# Patient Record
Sex: MALE | Race: WHITE | NOT HISPANIC OR LATINO | Employment: OTHER | URBAN - METROPOLITAN AREA
[De-identification: names, ages, dates, MRNs, and addresses within clinical notes are randomized per-mention and may not be internally consistent; named-entity substitution may affect disease eponyms.]

---

## 2017-04-10 RX ORDER — SIMVASTATIN 20 MG/1
TABLET, FILM COATED ORAL
Qty: 30 TABLET | Refills: 10 | Status: SHIPPED | OUTPATIENT
Start: 2017-04-10 | End: 2018-03-08 | Stop reason: SDUPTHER

## 2017-05-30 ENCOUNTER — PATIENT OUTREACH (OUTPATIENT)
Dept: ADMINISTRATIVE | Facility: HOSPITAL | Age: 72
End: 2017-05-30

## 2017-05-30 NOTE — LETTER
May 30, 2017    Dr Mayank Curran  [address] [address]             [facility]  [address]  [facility] [address]  Phone: [phone] May 30, 2017     Patient: Bob Chan    YOB: 1945   Date of Visit: 5/30/2017     To Whom It May Concern:    Please send most recent copy of Diabetic Eye Exam , since last report of Jan 2016 that states   Positive or Negative Retinopathy.      If you have any questions or concerns, please don't hesitate to call.     Sincerely,  MD June Vicente, LPN Care Coordination   [facility]  Phone [phone] ext [phone],  Fax [phone]

## 2017-06-01 ENCOUNTER — LAB VISIT (OUTPATIENT)
Dept: LAB | Facility: HOSPITAL | Age: 72
End: 2017-06-01
Attending: INTERNAL MEDICINE
Payer: MEDICARE

## 2017-06-01 DIAGNOSIS — R53.83 FATIGUE, UNSPECIFIED TYPE: ICD-10-CM

## 2017-06-01 DIAGNOSIS — E78.00 PURE HYPERCHOLESTEROLEMIA: ICD-10-CM

## 2017-06-01 DIAGNOSIS — Z12.5 SCREENING FOR PROSTATE CANCER: ICD-10-CM

## 2017-06-01 DIAGNOSIS — E11.9 DIABETES MELLITUS WITHOUT COMPLICATION: ICD-10-CM

## 2017-06-01 LAB
ALBUMIN SERPL BCP-MCNC: 3.7 G/DL
ALP SERPL-CCNC: 65 U/L
ALT SERPL W/O P-5'-P-CCNC: 20 U/L
ANION GAP SERPL CALC-SCNC: 7 MMOL/L
AST SERPL-CCNC: 18 U/L
BASOPHILS # BLD AUTO: 0.02 K/UL
BASOPHILS NFR BLD: 0.4 %
BILIRUB SERPL-MCNC: 1.3 MG/DL
BUN SERPL-MCNC: 17 MG/DL
CALCIUM SERPL-MCNC: 9.2 MG/DL
CHLORIDE SERPL-SCNC: 106 MMOL/L
CHOLEST/HDLC SERPL: 4 {RATIO}
CO2 SERPL-SCNC: 27 MMOL/L
COMPLEXED PSA SERPL-MCNC: 0.79 NG/ML
CREAT SERPL-MCNC: 0.9 MG/DL
DIFFERENTIAL METHOD: ABNORMAL
EOSINOPHIL # BLD AUTO: 0.1 K/UL
EOSINOPHIL NFR BLD: 1.5 %
ERYTHROCYTE [DISTWIDTH] IN BLOOD BY AUTOMATED COUNT: 13.8 %
EST. GFR  (AFRICAN AMERICAN): >60 ML/MIN/1.73 M^2
EST. GFR  (NON AFRICAN AMERICAN): >60 ML/MIN/1.73 M^2
GLUCOSE SERPL-MCNC: 111 MG/DL
HCT VFR BLD AUTO: 46.7 %
HDL/CHOLESTEROL RATIO: 25.3 %
HDLC SERPL-MCNC: 194 MG/DL
HDLC SERPL-MCNC: 49 MG/DL
HGB BLD-MCNC: 15.7 G/DL
LDLC SERPL CALC-MCNC: 127.2 MG/DL
LYMPHOCYTES # BLD AUTO: 0.9 K/UL
LYMPHOCYTES NFR BLD: 19.7 %
MCH RBC QN AUTO: 29.1 PG
MCHC RBC AUTO-ENTMCNC: 33.6 %
MCV RBC AUTO: 87 FL
MONOCYTES # BLD AUTO: 0.6 K/UL
MONOCYTES NFR BLD: 11.5 %
NEUTROPHILS # BLD AUTO: 3.2 K/UL
NEUTROPHILS NFR BLD: 66.5 %
NONHDLC SERPL-MCNC: 145 MG/DL
PLATELET # BLD AUTO: 121 K/UL
PMV BLD AUTO: 11 FL
POTASSIUM SERPL-SCNC: 4.2 MMOL/L
PROT SERPL-MCNC: 6.5 G/DL
RBC # BLD AUTO: 5.4 M/UL
SODIUM SERPL-SCNC: 140 MMOL/L
TRIGL SERPL-MCNC: 89 MG/DL
TSH SERPL DL<=0.005 MIU/L-ACNC: 0.92 UIU/ML
WBC # BLD AUTO: 4.78 K/UL

## 2017-06-01 PROCEDURE — 84153 ASSAY OF PSA TOTAL: CPT

## 2017-06-01 PROCEDURE — 85025 COMPLETE CBC W/AUTO DIFF WBC: CPT

## 2017-06-01 PROCEDURE — 83036 HEMOGLOBIN GLYCOSYLATED A1C: CPT

## 2017-06-01 PROCEDURE — 80053 COMPREHEN METABOLIC PANEL: CPT

## 2017-06-01 PROCEDURE — 36415 COLL VENOUS BLD VENIPUNCTURE: CPT | Mod: PO

## 2017-06-01 PROCEDURE — 84443 ASSAY THYROID STIM HORMONE: CPT

## 2017-06-01 PROCEDURE — 80061 LIPID PANEL: CPT

## 2017-06-02 LAB
ESTIMATED AVG GLUCOSE: 114 MG/DL
HBA1C MFR BLD HPLC: 5.6 %

## 2017-06-08 ENCOUNTER — OFFICE VISIT (OUTPATIENT)
Dept: INTERNAL MEDICINE | Facility: CLINIC | Age: 72
End: 2017-06-08
Payer: MEDICARE

## 2017-06-08 VITALS
TEMPERATURE: 98 F | OXYGEN SATURATION: 96 % | DIASTOLIC BLOOD PRESSURE: 84 MMHG | SYSTOLIC BLOOD PRESSURE: 134 MMHG | HEART RATE: 80 BPM | BODY MASS INDEX: 29.22 KG/M2 | WEIGHT: 197.31 LBS | HEIGHT: 69 IN

## 2017-06-08 DIAGNOSIS — J43.2 CENTRILOBULAR EMPHYSEMA: ICD-10-CM

## 2017-06-08 DIAGNOSIS — E78.00 PURE HYPERCHOLESTEROLEMIA: ICD-10-CM

## 2017-06-08 DIAGNOSIS — Z86.010 HISTORY OF COLONIC POLYPS: ICD-10-CM

## 2017-06-08 DIAGNOSIS — E11.9 DIABETES MELLITUS WITHOUT COMPLICATION: Primary | ICD-10-CM

## 2017-06-08 PROCEDURE — 99213 OFFICE O/P EST LOW 20 MIN: CPT | Mod: PBBFAC,PO | Performed by: INTERNAL MEDICINE

## 2017-06-08 PROCEDURE — 1126F AMNT PAIN NOTED NONE PRSNT: CPT | Mod: ,,, | Performed by: INTERNAL MEDICINE

## 2017-06-08 PROCEDURE — 1159F MED LIST DOCD IN RCRD: CPT | Mod: ,,, | Performed by: INTERNAL MEDICINE

## 2017-06-08 PROCEDURE — 99214 OFFICE O/P EST MOD 30 MIN: CPT | Mod: S$PBB,,, | Performed by: INTERNAL MEDICINE

## 2017-06-08 PROCEDURE — 99999 PR PBB SHADOW E&M-EST. PATIENT-LVL III: CPT | Mod: PBBFAC,,, | Performed by: INTERNAL MEDICINE

## 2017-06-08 PROCEDURE — 3044F HG A1C LEVEL LT 7.0%: CPT | Mod: ,,, | Performed by: INTERNAL MEDICINE

## 2017-06-08 NOTE — PROGRESS NOTES
"HPI:  Patient is a 71-year-old man who comes in today for follow-up of his lipids, diabetes, emphysema and his annual physical.  He's been doing very well.  He's been losing some weight.  He denies any hypoglycemic problems.  His breathing is gotten much easier with the weight loss.  There have been no other new problems or complaints.      Current MEDS: medcard review, verified and update  Allergies: Per the electronic medical record    Past Medical History:   Diagnosis Date    Diabetes mellitus without complication 12/14/2012    Emphysema of lung     History of colonic polyps     Pure hypercholesterolemia        History reviewed. No pertinent surgical history.    SHx: per the electronic medical record    FHx: recorded in the electronic medical record    ROS:    denies any chest pains or shortness of breath. Denies any nausea, vomiting or diarrhea. Denies any fever, chills or sweats. Denies any change in weight, voice, stool, skin or hair. Denies any dysuria, dyspepsia or dysphagia. Denies any change in vision, hearing or headaches. Denies any swollen lymph nodes or loss of memory.    PE:  /84   Pulse 80   Temp 97.7 °F (36.5 °C) (Tympanic)   Ht 5' 9" (1.753 m)   Wt 89.5 kg (197 lb 5 oz)   SpO2 96%   BMI 29.14 kg/m²   Gen: Well-developed, well-nourished, male, in no acute distress, oriented x3  HEENT: neck is supple, no adenopathy, carotids 2+ equal without bruits, thyroid exam normal size without nodules.  CHEST: clear to auscultation and percussion  CVS: regular rate and rhythm without significant murmur, gallop, or rubs  ABD: soft, benign, no rebound no guarding, no distention.  Bowel sounds are normal.     nontender.  No palpable masses.  No organomegaly and no audible bruits.  RECTAL: no masses.  Prostate 30  Grams without nodules.  EXT: no clubbing, cyanosis, or edema  LYMPH: no cervical, inguinal, or axillary adenopathy  FEET: no loss of sensation.  No ulcers or pressure sores.  Monofilament " testing normal  NEURO: gait normal.  Cranial nerves II- XII intact. No nystagmus.  Speech normal.   Gross motor and sensory unremarkable.    Lab Results   Component Value Date    WBC 4.78 06/01/2017    HGB 15.7 06/01/2017    HCT 46.7 06/01/2017     (L) 06/01/2017    CHOL 194 06/01/2017    TRIG 89 06/01/2017    HDL 49 06/01/2017    ALT 20 06/01/2017    AST 18 06/01/2017     06/01/2017    K 4.2 06/01/2017     06/01/2017    CREATININE 0.9 06/01/2017    BUN 17 06/01/2017    CO2 27 06/01/2017    TSH 0.917 06/01/2017    PSA 0.79 06/01/2017    HGBA1C 5.6 06/01/2017       Impression:  Multiple medical problems below, stable  Patient Active Problem List   Diagnosis    Pure hypercholesterolemia    Centrilobular emphysema    Diabetes mellitus without complication    History of colonic polyps       Plan:   Orders Placed This Encounter    Hemoglobin A1c    Comprehensive metabolic panel    Lipid panel    Protein / creatinine ratio, urine   .  His medications remain the same.  He'll be seen again in 6 months with above lab work.

## 2017-07-04 RX ORDER — ALPRAZOLAM 0.25 MG/1
TABLET ORAL
Qty: 60 TABLET | Refills: 5 | Status: SHIPPED | OUTPATIENT
Start: 2017-07-04 | End: 2017-09-27

## 2017-09-27 ENCOUNTER — OFFICE VISIT (OUTPATIENT)
Dept: INTERNAL MEDICINE | Facility: CLINIC | Age: 72
End: 2017-09-27
Payer: MEDICARE

## 2017-09-27 VITALS
OXYGEN SATURATION: 94 % | HEART RATE: 90 BPM | HEIGHT: 69 IN | BODY MASS INDEX: 29.78 KG/M2 | TEMPERATURE: 100 F | DIASTOLIC BLOOD PRESSURE: 70 MMHG | SYSTOLIC BLOOD PRESSURE: 138 MMHG | WEIGHT: 201.06 LBS

## 2017-09-27 DIAGNOSIS — G89.29 CHRONIC RIGHT-SIDED LOW BACK PAIN WITHOUT SCIATICA: Primary | ICD-10-CM

## 2017-09-27 DIAGNOSIS — M54.50 CHRONIC RIGHT-SIDED LOW BACK PAIN WITHOUT SCIATICA: Primary | ICD-10-CM

## 2017-09-27 PROCEDURE — 99213 OFFICE O/P EST LOW 20 MIN: CPT | Mod: S$PBB,,, | Performed by: INTERNAL MEDICINE

## 2017-09-27 PROCEDURE — 99999 PR PBB SHADOW E&M-EST. PATIENT-LVL III: CPT | Mod: PBBFAC,,, | Performed by: INTERNAL MEDICINE

## 2017-09-27 PROCEDURE — 1159F MED LIST DOCD IN RCRD: CPT | Mod: ,,, | Performed by: INTERNAL MEDICINE

## 2017-09-27 PROCEDURE — 99213 OFFICE O/P EST LOW 20 MIN: CPT | Mod: PBBFAC,PO | Performed by: INTERNAL MEDICINE

## 2017-09-27 PROCEDURE — 1125F AMNT PAIN NOTED PAIN PRSNT: CPT | Mod: ,,, | Performed by: INTERNAL MEDICINE

## 2017-09-27 RX ORDER — FLUTICASONE PROPIONATE AND SALMETEROL 250; 50 UG/1; UG/1
POWDER RESPIRATORY (INHALATION)
COMMUNITY
Start: 2017-08-22 | End: 2018-05-25

## 2017-09-27 RX ORDER — TIZANIDINE 4 MG/1
4 TABLET ORAL EVERY 8 HOURS PRN
Qty: 60 TABLET | Refills: 1 | Status: SHIPPED | OUTPATIENT
Start: 2017-09-27 | End: 2017-10-07

## 2017-09-27 RX ORDER — MELOXICAM 15 MG/1
15 TABLET ORAL DAILY
Qty: 30 TABLET | Refills: 2 | Status: SHIPPED | OUTPATIENT
Start: 2017-09-27 | End: 2018-06-05 | Stop reason: SDUPTHER

## 2017-09-27 NOTE — PROGRESS NOTES
"HPI:  Patient is a 72-year-old man who comes in today with complaints of lower lumbar back pain primarily in the right side.  It's been bothering him progressively for the last several weeks.  He denies any trauma or injury to the back.  He states is a deep boring pain.  He denies any radiation into his lower extremities.    Current meds have been verified and updated per the EMR  Exam:/70   Pulse 90   Temp 99.5 °F (37.5 °C) (Tympanic)   Ht 5' 9" (1.753 m)   Wt 91.2 kg (201 lb 1 oz)   SpO2 (!) 94%   BMI 29.69 kg/m²   He has point tenderness over the L3-L4 paraspinous muscles on the right side.  There is no vertebral tenderness.    Lab Results   Component Value Date    WBC 4.78 06/01/2017    HGB 15.7 06/01/2017    HCT 46.7 06/01/2017     (L) 06/01/2017    CHOL 194 06/01/2017    TRIG 89 06/01/2017    HDL 49 06/01/2017    ALT 20 06/01/2017    AST 18 06/01/2017     06/01/2017    K 4.2 06/01/2017     06/01/2017    CREATININE 0.9 06/01/2017    BUN 17 06/01/2017    CO2 27 06/01/2017    TSH 0.917 06/01/2017    PSA 0.79 06/01/2017    HGBA1C 5.6 06/01/2017       Impression:  Muscular  lumbar back pain  Patient Active Problem List   Diagnosis    Pure hypercholesterolemia    Centrilobular emphysema    Diabetes mellitus without complication    History of colonic polyps       Plan:  Orders Placed This Encounter    meloxicam (MOBIC) 15 MG tablet    tizanidine (ZANAFLEX) 4 MG tablet     He was started on mobic and Zanaflex.  He will try to get records from his pulmonologist, Dr. Mak, in regards to some questionable abnormality seen on a CT scan that he had done a year ago    "

## 2017-12-01 ENCOUNTER — LAB VISIT (OUTPATIENT)
Dept: LAB | Facility: HOSPITAL | Age: 72
End: 2017-12-01
Attending: INTERNAL MEDICINE
Payer: MEDICARE

## 2017-12-01 DIAGNOSIS — E11.9 DIABETES MELLITUS WITHOUT COMPLICATION: ICD-10-CM

## 2017-12-01 LAB
ALBUMIN SERPL BCP-MCNC: 3.9 G/DL
ALP SERPL-CCNC: 81 U/L
ALT SERPL W/O P-5'-P-CCNC: 18 U/L
ANION GAP SERPL CALC-SCNC: 7 MMOL/L
AST SERPL-CCNC: 16 U/L
BILIRUB SERPL-MCNC: 1.1 MG/DL
BUN SERPL-MCNC: 16 MG/DL
CALCIUM SERPL-MCNC: 9.7 MG/DL
CHLORIDE SERPL-SCNC: 105 MMOL/L
CHOLEST SERPL-MCNC: 189 MG/DL
CHOLEST/HDLC SERPL: 3.9 {RATIO}
CO2 SERPL-SCNC: 29 MMOL/L
CREAT SERPL-MCNC: 0.9 MG/DL
EST. GFR  (AFRICAN AMERICAN): >60 ML/MIN/1.73 M^2
EST. GFR  (NON AFRICAN AMERICAN): >60 ML/MIN/1.73 M^2
ESTIMATED AVG GLUCOSE: 111 MG/DL
GLUCOSE SERPL-MCNC: 99 MG/DL
HBA1C MFR BLD HPLC: 5.5 %
HDLC SERPL-MCNC: 49 MG/DL
HDLC SERPL: 25.9 %
LDLC SERPL CALC-MCNC: 124.8 MG/DL
NONHDLC SERPL-MCNC: 140 MG/DL
POTASSIUM SERPL-SCNC: 5.1 MMOL/L
PROT SERPL-MCNC: 6.9 G/DL
SODIUM SERPL-SCNC: 141 MMOL/L
TRIGL SERPL-MCNC: 76 MG/DL

## 2017-12-01 PROCEDURE — 80061 LIPID PANEL: CPT

## 2017-12-01 PROCEDURE — 80053 COMPREHEN METABOLIC PANEL: CPT

## 2017-12-01 PROCEDURE — 36415 COLL VENOUS BLD VENIPUNCTURE: CPT | Mod: PO

## 2017-12-01 PROCEDURE — 83036 HEMOGLOBIN GLYCOSYLATED A1C: CPT

## 2017-12-11 ENCOUNTER — OFFICE VISIT (OUTPATIENT)
Dept: INTERNAL MEDICINE | Facility: CLINIC | Age: 72
End: 2017-12-11
Payer: MEDICARE

## 2017-12-11 VITALS
OXYGEN SATURATION: 95 % | WEIGHT: 200.63 LBS | HEIGHT: 69 IN | HEART RATE: 85 BPM | DIASTOLIC BLOOD PRESSURE: 88 MMHG | SYSTOLIC BLOOD PRESSURE: 136 MMHG | BODY MASS INDEX: 29.71 KG/M2 | TEMPERATURE: 97 F

## 2017-12-11 DIAGNOSIS — J43.2 CENTRILOBULAR EMPHYSEMA: ICD-10-CM

## 2017-12-11 DIAGNOSIS — E78.00 PURE HYPERCHOLESTEROLEMIA: Primary | ICD-10-CM

## 2017-12-11 DIAGNOSIS — Z86.010 HISTORY OF COLONIC POLYPS: ICD-10-CM

## 2017-12-11 DIAGNOSIS — E11.9 DIABETES MELLITUS WITHOUT COMPLICATION: ICD-10-CM

## 2017-12-11 DIAGNOSIS — Z12.5 PROSTATE CANCER SCREENING: ICD-10-CM

## 2017-12-11 PROCEDURE — G0008 ADMIN INFLUENZA VIRUS VAC: HCPCS | Mod: PBBFAC,PO

## 2017-12-11 PROCEDURE — 99213 OFFICE O/P EST LOW 20 MIN: CPT | Mod: PBBFAC,PO | Performed by: INTERNAL MEDICINE

## 2017-12-11 PROCEDURE — 99213 OFFICE O/P EST LOW 20 MIN: CPT | Mod: 25,S$PBB,, | Performed by: INTERNAL MEDICINE

## 2017-12-11 PROCEDURE — 99999 PR PBB SHADOW E&M-EST. PATIENT-LVL III: CPT | Mod: PBBFAC,,, | Performed by: INTERNAL MEDICINE

## 2017-12-11 NOTE — PROGRESS NOTES
"HPI:  Patient is a 72-year-old man who comes today for follow-up his diabetes, hypertension, lipids.  He continues to do extremely well.  He is extremely active.  He denies any hypoglycemic problems.  He's had no other complaints    Current meds have been verified and updated per the EMR  Exam:/88   Pulse 85   Temp 97.1 °F (36.2 °C) (Tympanic)   Ht 5' 9" (1.753 m)   Wt 91 kg (200 lb 9.9 oz)   SpO2 95%   BMI 29.63 kg/m²   Exam deferred    Lab Results   Component Value Date    WBC 4.78 06/01/2017    HGB 15.7 06/01/2017    HCT 46.7 06/01/2017     (L) 06/01/2017    CHOL 189 12/01/2017    TRIG 76 12/01/2017    HDL 49 12/01/2017    ALT 18 12/01/2017    AST 16 12/01/2017     12/01/2017    K 5.1 12/01/2017     12/01/2017    CREATININE 0.9 12/01/2017    BUN 16 12/01/2017    CO2 29 12/01/2017    TSH 0.917 06/01/2017    PSA 0.79 06/01/2017    HGBA1C 5.5 12/01/2017       Impression:  Multiple medical problems below, stable  Patient Active Problem List   Diagnosis    Pure hypercholesterolemia    Centrilobular emphysema    Diabetes mellitus without complication    History of colonic polyps       Plan:  Orders Placed This Encounter    Influenza - High Dose (65+) (PF) (IM)    Hemoglobin A1c    Comprehensive metabolic panel    Lipid panel    Protein / creatinine ratio, urine    TSH    CBC auto differential    PSA, Screening     He was given flu vaccine today.  He'll be seen in 6 months with above lab work.  Her medications remain the same    "

## 2018-01-22 RX ORDER — ALPRAZOLAM 0.25 MG/1
TABLET ORAL
Qty: 60 TABLET | Refills: 4 | Status: SHIPPED | OUTPATIENT
Start: 2018-01-22 | End: 2018-09-18 | Stop reason: SDUPTHER

## 2018-01-24 RX ORDER — ALPRAZOLAM 0.25 MG/1
TABLET ORAL
Qty: 60 TABLET | Refills: 5 | Status: SHIPPED | OUTPATIENT
Start: 2018-01-24 | End: 2018-05-25

## 2018-03-08 RX ORDER — SIMVASTATIN 20 MG/1
TABLET, FILM COATED ORAL
Qty: 30 TABLET | Refills: 11 | Status: SHIPPED | OUTPATIENT
Start: 2018-03-08 | End: 2019-03-01 | Stop reason: SDUPTHER

## 2018-05-25 ENCOUNTER — OFFICE VISIT (OUTPATIENT)
Dept: INTERNAL MEDICINE | Facility: CLINIC | Age: 73
End: 2018-05-25
Payer: MEDICARE

## 2018-05-25 VITALS
WEIGHT: 213.88 LBS | SYSTOLIC BLOOD PRESSURE: 136 MMHG | BODY MASS INDEX: 30.62 KG/M2 | RESPIRATION RATE: 16 BRPM | HEIGHT: 70 IN | DIASTOLIC BLOOD PRESSURE: 84 MMHG | OXYGEN SATURATION: 97 % | TEMPERATURE: 98 F | HEART RATE: 78 BPM

## 2018-05-25 DIAGNOSIS — M54.50 LUMBAR BACK PAIN: Primary | ICD-10-CM

## 2018-05-25 PROCEDURE — 99999 PR PBB SHADOW E&M-EST. PATIENT-LVL III: CPT | Mod: PBBFAC,,, | Performed by: INTERNAL MEDICINE

## 2018-05-25 PROCEDURE — 99213 OFFICE O/P EST LOW 20 MIN: CPT | Mod: S$PBB,,, | Performed by: INTERNAL MEDICINE

## 2018-05-25 PROCEDURE — 99213 OFFICE O/P EST LOW 20 MIN: CPT | Mod: PBBFAC,PO | Performed by: INTERNAL MEDICINE

## 2018-05-25 NOTE — PROGRESS NOTES
"HPI:  Patient is a 72-year-old man who comes today with complaints right-sided lower lumbar back pain that began yesterday.  He has a history of intermittent back spasms.  He wants to know if it okay to resume his Mobic and Zanaflex.  Patient denies any pain into his buttocks.    Current meds have been verified and updated per the EMR  Exam:/84   Pulse 78   Temp 98.4 °F (36.9 °C)   Resp 16   Ht 5' 10" (1.778 m)   Wt 97 kg (213 lb 13.5 oz)   SpO2 97%   BMI 30.68 kg/m²   He has tenderness of the L to 3 paraspinous muscles on the right side.  There is no vertebral tenderness.    Lab Results   Component Value Date    WBC 4.78 06/01/2017    HGB 15.7 06/01/2017    HCT 46.7 06/01/2017     (L) 06/01/2017    CHOL 189 12/01/2017    TRIG 76 12/01/2017    HDL 49 12/01/2017    ALT 18 12/01/2017    AST 16 12/01/2017     12/01/2017    K 5.1 12/01/2017     12/01/2017    CREATININE 0.9 12/01/2017    BUN 16 12/01/2017    CO2 29 12/01/2017    TSH 0.917 06/01/2017    PSA 0.79 06/01/2017    HGBA1C 5.5 12/01/2017       Impression:  Lumbar muscular back pain  Patient Active Problem List   Diagnosis    Pure hypercholesterolemia    Centrilobular emphysema    Diabetes mellitus without complication    History of colonic polyps       Plan:     He may resume taking his anti-inflammatories and muscle relaxers.  Also encouraged him to do stretching exercises.    "

## 2018-05-28 ENCOUNTER — PATIENT OUTREACH (OUTPATIENT)
Dept: ADMINISTRATIVE | Facility: HOSPITAL | Age: 73
End: 2018-05-28

## 2018-06-04 ENCOUNTER — LAB VISIT (OUTPATIENT)
Dept: LAB | Facility: HOSPITAL | Age: 73
End: 2018-06-04
Attending: INTERNAL MEDICINE
Payer: MEDICARE

## 2018-06-04 DIAGNOSIS — Z12.5 PROSTATE CANCER SCREENING: ICD-10-CM

## 2018-06-04 DIAGNOSIS — E11.9 DIABETES MELLITUS WITHOUT COMPLICATION: ICD-10-CM

## 2018-06-04 LAB
ALBUMIN SERPL BCP-MCNC: 3.9 G/DL
ALP SERPL-CCNC: 81 U/L
ALT SERPL W/O P-5'-P-CCNC: 22 U/L
ANION GAP SERPL CALC-SCNC: 10 MMOL/L
AST SERPL-CCNC: 18 U/L
BASOPHILS # BLD AUTO: 0.04 K/UL
BASOPHILS NFR BLD: 0.7 %
BILIRUB SERPL-MCNC: 0.8 MG/DL
BUN SERPL-MCNC: 15 MG/DL
CALCIUM SERPL-MCNC: 9.5 MG/DL
CHLORIDE SERPL-SCNC: 105 MMOL/L
CHOLEST SERPL-MCNC: 179 MG/DL
CHOLEST/HDLC SERPL: 3.4 {RATIO}
CO2 SERPL-SCNC: 28 MMOL/L
COMPLEXED PSA SERPL-MCNC: 0.77 NG/ML
CREAT SERPL-MCNC: 1 MG/DL
DIFFERENTIAL METHOD: ABNORMAL
EOSINOPHIL # BLD AUTO: 0.1 K/UL
EOSINOPHIL NFR BLD: 1.8 %
ERYTHROCYTE [DISTWIDTH] IN BLOOD BY AUTOMATED COUNT: 13.5 %
EST. GFR  (AFRICAN AMERICAN): >60 ML/MIN/1.73 M^2
EST. GFR  (NON AFRICAN AMERICAN): >60 ML/MIN/1.73 M^2
GLUCOSE SERPL-MCNC: 103 MG/DL
HCT VFR BLD AUTO: 48.1 %
HDLC SERPL-MCNC: 52 MG/DL
HDLC SERPL: 29.1 %
HGB BLD-MCNC: 15.5 G/DL
IMM GRANULOCYTES # BLD AUTO: 0.02 K/UL
IMM GRANULOCYTES NFR BLD AUTO: 0.3 %
LDLC SERPL CALC-MCNC: 106.6 MG/DL
LYMPHOCYTES # BLD AUTO: 1.1 K/UL
LYMPHOCYTES NFR BLD: 18.4 %
MCH RBC QN AUTO: 28.5 PG
MCHC RBC AUTO-ENTMCNC: 32.2 G/DL
MCV RBC AUTO: 88 FL
MONOCYTES # BLD AUTO: 0.5 K/UL
MONOCYTES NFR BLD: 8.6 %
NEUTROPHILS # BLD AUTO: 4.3 K/UL
NEUTROPHILS NFR BLD: 70.2 %
NONHDLC SERPL-MCNC: 127 MG/DL
NRBC BLD-RTO: 0 /100 WBC
PLATELET # BLD AUTO: 146 K/UL
PMV BLD AUTO: 11.2 FL
POTASSIUM SERPL-SCNC: 4.4 MMOL/L
PROT SERPL-MCNC: 6.8 G/DL
RBC # BLD AUTO: 5.44 M/UL
SODIUM SERPL-SCNC: 143 MMOL/L
TRIGL SERPL-MCNC: 102 MG/DL
TSH SERPL DL<=0.005 MIU/L-ACNC: 1.37 UIU/ML
WBC # BLD AUTO: 6.14 K/UL

## 2018-06-04 PROCEDURE — 36415 COLL VENOUS BLD VENIPUNCTURE: CPT | Mod: PO

## 2018-06-04 PROCEDURE — 83036 HEMOGLOBIN GLYCOSYLATED A1C: CPT

## 2018-06-04 PROCEDURE — 80053 COMPREHEN METABOLIC PANEL: CPT

## 2018-06-04 PROCEDURE — 84153 ASSAY OF PSA TOTAL: CPT

## 2018-06-04 PROCEDURE — 80061 LIPID PANEL: CPT

## 2018-06-04 PROCEDURE — 85025 COMPLETE CBC W/AUTO DIFF WBC: CPT

## 2018-06-04 PROCEDURE — 84443 ASSAY THYROID STIM HORMONE: CPT

## 2018-06-05 ENCOUNTER — OFFICE VISIT (OUTPATIENT)
Dept: INTERNAL MEDICINE | Facility: CLINIC | Age: 73
End: 2018-06-05
Payer: MEDICARE

## 2018-06-05 VITALS
SYSTOLIC BLOOD PRESSURE: 126 MMHG | OXYGEN SATURATION: 94 % | WEIGHT: 214.31 LBS | RESPIRATION RATE: 16 BRPM | TEMPERATURE: 98 F | BODY MASS INDEX: 30.68 KG/M2 | DIASTOLIC BLOOD PRESSURE: 80 MMHG | HEIGHT: 70 IN | HEART RATE: 86 BPM

## 2018-06-05 DIAGNOSIS — M54.50 LUMBAR BACK PAIN: Primary | ICD-10-CM

## 2018-06-05 LAB
ESTIMATED AVG GLUCOSE: 117 MG/DL
HBA1C MFR BLD HPLC: 5.7 %

## 2018-06-05 PROCEDURE — 99213 OFFICE O/P EST LOW 20 MIN: CPT | Mod: PBBFAC,PO | Performed by: INTERNAL MEDICINE

## 2018-06-05 PROCEDURE — 99999 PR PBB SHADOW E&M-EST. PATIENT-LVL III: CPT | Mod: PBBFAC,,, | Performed by: INTERNAL MEDICINE

## 2018-06-05 PROCEDURE — 99213 OFFICE O/P EST LOW 20 MIN: CPT | Mod: S$PBB,,, | Performed by: INTERNAL MEDICINE

## 2018-06-05 RX ORDER — CYCLOBENZAPRINE HCL 10 MG
10 TABLET ORAL 3 TIMES DAILY PRN
Qty: 60 TABLET | Refills: 1 | Status: SHIPPED | OUTPATIENT
Start: 2018-06-05 | End: 2018-06-15

## 2018-06-05 RX ORDER — MELOXICAM 15 MG/1
15 TABLET ORAL DAILY
Qty: 30 TABLET | Refills: 2 | Status: SHIPPED | OUTPATIENT
Start: 2018-06-05

## 2018-06-05 NOTE — PROGRESS NOTES
"HPI:  Patient is a 72-year-old man who comes today for complaints of right-sided lower lumbar back pain.  This been improvement in problem for many months but much more problematic in the last 24 hr.  He denies any pain going down to his buttocks are lower extremities.  He denies any trauma or injury.    Current meds have been verified and updated per the EMR  Exam:/80   Pulse 86   Temp 97.6 °F (36.4 °C)   Resp 16   Ht 5' 10" (1.778 m)   Wt 97.2 kg (214 lb 4.6 oz)   SpO2 (!) 94%   BMI 30.75 kg/m²   He has point tenderness over the L4-3 for paraspinous muscles on the right side.  Straight leg raises are normal    Lab Results   Component Value Date    WBC 6.14 06/04/2018    HGB 15.5 06/04/2018    HCT 48.1 06/04/2018     (L) 06/04/2018    CHOL 179 06/04/2018    TRIG 102 06/04/2018    HDL 52 06/04/2018    ALT 22 06/04/2018    AST 18 06/04/2018     06/04/2018    K 4.4 06/04/2018     06/04/2018    CREATININE 1.0 06/04/2018    BUN 15 06/04/2018    CO2 28 06/04/2018    TSH 1.369 06/04/2018    PSA 0.77 06/04/2018    HGBA1C 5.7 (H) 06/04/2018       Impression:  Mechanical muscular lower back pain  Patient Active Problem List   Diagnosis    Pure hypercholesterolemia    Centrilobular emphysema    Diabetes mellitus without complication    History of colonic polyps       Plan:  Orders Placed This Encounter    Ambulatory consult to Physical Therapy    meloxicam (MOBIC) 15 MG tablet    cyclobenzaprine (FLEXERIL) 10 MG tablet     Patient will be switched from Zanaflex to Flexeril.  He will continue with Mobic.  He was referred to see physical therapy.    "

## 2018-06-12 ENCOUNTER — OFFICE VISIT (OUTPATIENT)
Dept: INTERNAL MEDICINE | Facility: CLINIC | Age: 73
End: 2018-06-12
Payer: MEDICARE

## 2018-06-12 VITALS
HEART RATE: 82 BPM | HEIGHT: 70 IN | OXYGEN SATURATION: 96 % | BODY MASS INDEX: 31.12 KG/M2 | DIASTOLIC BLOOD PRESSURE: 82 MMHG | SYSTOLIC BLOOD PRESSURE: 128 MMHG | WEIGHT: 217.38 LBS | RESPIRATION RATE: 16 BRPM | TEMPERATURE: 98 F

## 2018-06-12 DIAGNOSIS — Z86.010 HISTORY OF COLONIC POLYPS: ICD-10-CM

## 2018-06-12 DIAGNOSIS — J43.2 CENTRILOBULAR EMPHYSEMA: ICD-10-CM

## 2018-06-12 DIAGNOSIS — E78.00 PURE HYPERCHOLESTEROLEMIA: Primary | ICD-10-CM

## 2018-06-12 DIAGNOSIS — E11.9 DIABETES MELLITUS WITHOUT COMPLICATION: ICD-10-CM

## 2018-06-12 PROCEDURE — 99999 PR PBB SHADOW E&M-EST. PATIENT-LVL III: CPT | Mod: PBBFAC,,, | Performed by: INTERNAL MEDICINE

## 2018-06-12 PROCEDURE — 99213 OFFICE O/P EST LOW 20 MIN: CPT | Mod: PBBFAC,PO | Performed by: INTERNAL MEDICINE

## 2018-06-12 PROCEDURE — 99214 OFFICE O/P EST MOD 30 MIN: CPT | Mod: S$PBB,,, | Performed by: INTERNAL MEDICINE

## 2018-06-12 RX ORDER — FLUTICASONE PROPIONATE AND SALMETEROL 250; 50 UG/1; UG/1
POWDER RESPIRATORY (INHALATION)
COMMUNITY
Start: 2018-06-07

## 2018-06-12 RX ORDER — MELOXICAM 15 MG/1
15 TABLET ORAL
COMMUNITY
Start: 2018-06-05 | End: 2018-06-12

## 2018-06-12 RX ORDER — CYCLOBENZAPRINE HCL 10 MG
10 TABLET ORAL
COMMUNITY
Start: 2018-06-05 | End: 2018-06-15

## 2018-06-12 NOTE — PROGRESS NOTES
"HPI:  Patient is a 72-year-old man who comes today for follow-up of his diabetes, lipids, emphysema and for his annual physical.  He has been doing well.  There has been no significant problems or complaints.  His lumbar back strain has improved.  He is now working out on a regular basis.      Current MEDS: medcard review, verified and update  Allergies: Per the electronic medical record    Past Medical History:   Diagnosis Date    Diabetes mellitus without complication 12/14/2012    Emphysema of lung     History of colonic polyps     Pure hypercholesterolemia        No past surgical history on file.    SHx: per the electronic medical record    FHx: recorded in the electronic medical record    ROS:    denies any chest pains or shortness of breath. Denies any nausea, vomiting or diarrhea. Denies any fever, chills or sweats. Denies any change in weight, voice, stool, skin or hair. Denies any dysuria, dyspepsia or dysphagia. Denies any change in vision, hearing or headaches. Denies any swollen lymph nodes or loss of memory.    PE:  /82   Pulse 82   Temp 97.8 °F (36.6 °C)   Resp 16   Ht 5' 10" (1.778 m)   Wt 98.6 kg (217 lb 6 oz)   SpO2 96%   BMI 31.19 kg/m²   Gen: Well-developed, well-nourished, male, in no acute distress, oriented x3  HEENT: neck is supple, no adenopathy, carotids 2+ equal without bruits, thyroid exam normal size without nodules.  CHEST: clear to auscultation and percussion  CVS: regular rate and rhythm without significant murmur, gallop, or rubs  ABD: soft, benign, no rebound no guarding, no distention.  Bowel sounds are normal.     nontender.  No palpable masses.  No organomegaly and no audible bruits.  RECTAL:  Deferred  EXT: no clubbing, cyanosis, or edema  LYMPH: no cervical, inguinal, or axillary adenopathy  FEET: no loss of sensation.  No ulcers or pressure sores.  Monofilament testing normal  NEURO: gait normal.  Cranial nerves II- XII intact. No nystagmus.  Speech normal.   " Gross motor and sensory unremarkable.    Lab Results   Component Value Date    WBC 6.14 06/04/2018    HGB 15.5 06/04/2018    HCT 48.1 06/04/2018     (L) 06/04/2018    CHOL 179 06/04/2018    TRIG 102 06/04/2018    HDL 52 06/04/2018    ALT 22 06/04/2018    AST 18 06/04/2018     06/04/2018    K 4.4 06/04/2018     06/04/2018    CREATININE 1.0 06/04/2018    BUN 15 06/04/2018    CO2 28 06/04/2018    TSH 1.369 06/04/2018    PSA 0.77 06/04/2018    HGBA1C 5.7 (H) 06/04/2018       Impression:  Multiple medical problems below, stable  Patient Active Problem List   Diagnosis    Pure hypercholesterolemia    Centrilobular emphysema    Diabetes mellitus without complication    History of colonic polyps       Plan:   Orders Placed This Encounter    Hemoglobin A1c    Lipid panel    Basic metabolic panel     Medications remain the same.  Patient will see me again in 6 months.

## 2018-09-18 RX ORDER — ALPRAZOLAM 0.25 MG/1
TABLET ORAL
Qty: 60 TABLET | Refills: 4 | Status: SHIPPED | OUTPATIENT
Start: 2018-09-18 | End: 2019-05-01 | Stop reason: SDUPTHER

## 2018-12-03 ENCOUNTER — LAB VISIT (OUTPATIENT)
Dept: LAB | Facility: HOSPITAL | Age: 73
End: 2018-12-03
Attending: INTERNAL MEDICINE
Payer: MEDICARE

## 2018-12-03 DIAGNOSIS — E11.9 DIABETES MELLITUS WITHOUT COMPLICATION: ICD-10-CM

## 2018-12-03 LAB
ANION GAP SERPL CALC-SCNC: 6 MMOL/L
BUN SERPL-MCNC: 15 MG/DL
CALCIUM SERPL-MCNC: 9.6 MG/DL
CHLORIDE SERPL-SCNC: 105 MMOL/L
CHOLEST SERPL-MCNC: 186 MG/DL
CHOLEST/HDLC SERPL: 3.7 {RATIO}
CO2 SERPL-SCNC: 31 MMOL/L
CREAT SERPL-MCNC: 0.9 MG/DL
EST. GFR  (AFRICAN AMERICAN): >60 ML/MIN/1.73 M^2
EST. GFR  (NON AFRICAN AMERICAN): >60 ML/MIN/1.73 M^2
ESTIMATED AVG GLUCOSE: 128 MG/DL
GLUCOSE SERPL-MCNC: 113 MG/DL
HBA1C MFR BLD HPLC: 6.1 %
HDLC SERPL-MCNC: 50 MG/DL
HDLC SERPL: 26.9 %
LDLC SERPL CALC-MCNC: 117.2 MG/DL
NONHDLC SERPL-MCNC: 136 MG/DL
POTASSIUM SERPL-SCNC: 4.4 MMOL/L
SODIUM SERPL-SCNC: 142 MMOL/L
TRIGL SERPL-MCNC: 94 MG/DL

## 2018-12-03 PROCEDURE — 80061 LIPID PANEL: CPT

## 2018-12-03 PROCEDURE — 83036 HEMOGLOBIN GLYCOSYLATED A1C: CPT

## 2018-12-03 PROCEDURE — 36415 COLL VENOUS BLD VENIPUNCTURE: CPT | Mod: PO

## 2018-12-03 PROCEDURE — 80048 BASIC METABOLIC PNL TOTAL CA: CPT

## 2018-12-12 ENCOUNTER — OFFICE VISIT (OUTPATIENT)
Dept: INTERNAL MEDICINE | Facility: CLINIC | Age: 73
End: 2018-12-12
Payer: MEDICARE

## 2018-12-12 VITALS
HEIGHT: 68 IN | DIASTOLIC BLOOD PRESSURE: 78 MMHG | OXYGEN SATURATION: 97 % | SYSTOLIC BLOOD PRESSURE: 112 MMHG | WEIGHT: 221.13 LBS | BODY MASS INDEX: 33.51 KG/M2 | TEMPERATURE: 99 F | HEART RATE: 85 BPM

## 2018-12-12 DIAGNOSIS — E11.9 DIABETES MELLITUS WITHOUT COMPLICATION: Primary | ICD-10-CM

## 2018-12-12 DIAGNOSIS — E78.00 PURE HYPERCHOLESTEROLEMIA: ICD-10-CM

## 2018-12-12 DIAGNOSIS — J43.2 CENTRILOBULAR EMPHYSEMA: ICD-10-CM

## 2018-12-12 DIAGNOSIS — Z86.010 HISTORY OF COLONIC POLYPS: ICD-10-CM

## 2018-12-12 DIAGNOSIS — Z12.5 SCREENING PSA (PROSTATE SPECIFIC ANTIGEN): ICD-10-CM

## 2018-12-12 PROCEDURE — 99213 OFFICE O/P EST LOW 20 MIN: CPT | Mod: PBBFAC,PO | Performed by: INTERNAL MEDICINE

## 2018-12-12 PROCEDURE — 99999 PR PBB SHADOW E&M-EST. PATIENT-LVL III: CPT | Mod: PBBFAC,,, | Performed by: INTERNAL MEDICINE

## 2018-12-12 PROCEDURE — 90662 IIV NO PRSV INCREASED AG IM: CPT | Mod: PBBFAC,PO

## 2018-12-12 PROCEDURE — 99213 OFFICE O/P EST LOW 20 MIN: CPT | Mod: 25,S$PBB,, | Performed by: INTERNAL MEDICINE

## 2018-12-12 NOTE — PROGRESS NOTES
"HPI:  Patient is a 73-year-old man who comes today for follow-up of his diabetes, hypertension, and lipids.  He is Doing well.  He really has no reported problems or complaints.  He denies any hypoglycemia.  He admits he has put on 10 lb of weight in the last 6 months.    Current meds have been verified and updated per the EMR  Exam:/78 (BP Location: Right arm, Patient Position: Sitting, BP Method: Large (Manual))   Pulse 85   Temp 98.6 °F (37 °C) (Tympanic)   Ht 5' 8" (1.727 m)   Wt 100.3 kg (221 lb 1.9 oz)   SpO2 97%   BMI 33.62 kg/m²   Carotids 2+ equal without bruits  Chest clear  Cardiovascular regular rate and rhythm without murmur gallop or rub  Lab Results   Component Value Date    WBC 6.14 06/04/2018    HGB 15.5 06/04/2018    HCT 48.1 06/04/2018     (L) 06/04/2018    CHOL 186 12/03/2018    TRIG 94 12/03/2018    HDL 50 12/03/2018    ALT 22 06/04/2018    AST 18 06/04/2018     12/03/2018    K 4.4 12/03/2018     12/03/2018    CREATININE 0.9 12/03/2018    BUN 15 12/03/2018    CO2 31 (H) 12/03/2018    TSH 1.369 06/04/2018    PSA 0.77 06/04/2018    HGBA1C 6.1 (H) 12/03/2018       Impression:  Multiple problems below, stable  Patient Active Problem List   Diagnosis    Pure hypercholesterolemia    Centrilobular emphysema    Diabetes mellitus without complication    History of colonic polyps       Plan:  Orders Placed This Encounter    Influenza - High Dose (65+) (PF) (IM)    Hemoglobin A1c    Comprehensive metabolic panel    Lipid panel    Protein / creatinine ratio, urine    TSH    CBC auto differential    PSA, Screening     Medications remain the same.  He will be seen again in 6 months with above lab work.  He was given the flu vaccine today    "

## 2018-12-12 NOTE — PROGRESS NOTES
Fluzone High Dose administered. See immunization records. Pt advised to wait 15 minutes in clinic to monitor for side effects. Pt voiced understanding and tolerated injection well.

## 2019-03-01 RX ORDER — SIMVASTATIN 20 MG/1
TABLET, FILM COATED ORAL
Qty: 30 TABLET | Refills: 10 | Status: SHIPPED | OUTPATIENT
Start: 2019-03-01 | End: 2019-12-09

## 2019-03-06 ENCOUNTER — PES CALL (OUTPATIENT)
Dept: ADMINISTRATIVE | Facility: CLINIC | Age: 74
End: 2019-03-06

## 2019-05-01 RX ORDER — ALPRAZOLAM 0.25 MG/1
TABLET ORAL
Qty: 60 TABLET | Refills: 5 | Status: SHIPPED | OUTPATIENT
Start: 2019-05-01 | End: 2019-11-13 | Stop reason: SDUPTHER

## 2019-06-05 ENCOUNTER — PATIENT OUTREACH (OUTPATIENT)
Dept: ADMINISTRATIVE | Facility: HOSPITAL | Age: 74
End: 2019-06-05

## 2019-06-05 NOTE — LETTER
June 5, 2019        Mayank Curran MD  [address]  [place] [address]             [facility]  [address] [facility] [address]  Phone: [phone]   Patient: Bob Chan   MR Number: [id number]   YOB: 1945   Date of Visit: 6/5/2019       Dear Dr. Curran:    To whom it may concern     We are seeing Bob Chan, YOB: 1945, at [facility]. Minor Pandey MD is their primary care physician. To help with our health maintenance records could you please send the following:     Most recent copy of Diabetic Eye Exam that states Positive or Negative Retinopathy.        Thank-you in advance for your assistance. If you have any questions or concerns, please don't hesitate to contact me at [phone].         Sincerely,  June MENENDEZ LPN Care Coordination   [facility]  Phone [phone] ext [phone],  Fax [phone]

## 2019-06-12 ENCOUNTER — LAB VISIT (OUTPATIENT)
Dept: LAB | Facility: HOSPITAL | Age: 74
End: 2019-06-12
Attending: INTERNAL MEDICINE
Payer: MEDICARE

## 2019-06-12 DIAGNOSIS — E11.9 DIABETES MELLITUS WITHOUT COMPLICATION: ICD-10-CM

## 2019-06-12 DIAGNOSIS — Z12.5 SCREENING PSA (PROSTATE SPECIFIC ANTIGEN): ICD-10-CM

## 2019-06-12 LAB
BASOPHILS # BLD AUTO: 0.02 K/UL (ref 0–0.2)
BASOPHILS NFR BLD: 0.3 % (ref 0–1.9)
COMPLEXED PSA SERPL-MCNC: 0.49 NG/ML (ref 0–4)
DIFFERENTIAL METHOD: ABNORMAL
EOSINOPHIL # BLD AUTO: 0.1 K/UL (ref 0–0.5)
EOSINOPHIL NFR BLD: 1.4 % (ref 0–8)
ERYTHROCYTE [DISTWIDTH] IN BLOOD BY AUTOMATED COUNT: 13.9 % (ref 11.5–14.5)
HCT VFR BLD AUTO: 48.3 % (ref 40–54)
HGB BLD-MCNC: 15.6 G/DL (ref 14–18)
IMM GRANULOCYTES # BLD AUTO: 0.03 K/UL (ref 0–0.04)
IMM GRANULOCYTES NFR BLD AUTO: 0.5 % (ref 0–0.5)
LYMPHOCYTES # BLD AUTO: 1.1 K/UL (ref 1–4.8)
LYMPHOCYTES NFR BLD: 18.1 % (ref 18–48)
MCH RBC QN AUTO: 28.6 PG (ref 27–31)
MCHC RBC AUTO-ENTMCNC: 32.3 G/DL (ref 32–36)
MCV RBC AUTO: 89 FL (ref 82–98)
MONOCYTES # BLD AUTO: 0.5 K/UL (ref 0.3–1)
MONOCYTES NFR BLD: 9.1 % (ref 4–15)
NEUTROPHILS # BLD AUTO: 4.1 K/UL (ref 1.8–7.7)
NEUTROPHILS NFR BLD: 70.6 % (ref 38–73)
NRBC BLD-RTO: 0 /100 WBC
PLATELET # BLD AUTO: 121 K/UL (ref 150–350)
PMV BLD AUTO: 10.6 FL (ref 9.2–12.9)
RBC # BLD AUTO: 5.46 M/UL (ref 4.6–6.2)
WBC # BLD AUTO: 5.8 K/UL (ref 3.9–12.7)

## 2019-06-12 PROCEDURE — 84443 ASSAY THYROID STIM HORMONE: CPT

## 2019-06-12 PROCEDURE — 36415 COLL VENOUS BLD VENIPUNCTURE: CPT | Mod: PO

## 2019-06-12 PROCEDURE — 84153 ASSAY OF PSA TOTAL: CPT

## 2019-06-12 PROCEDURE — 85025 COMPLETE CBC W/AUTO DIFF WBC: CPT

## 2019-06-12 PROCEDURE — 80061 LIPID PANEL: CPT

## 2019-06-12 PROCEDURE — 80053 COMPREHEN METABOLIC PANEL: CPT

## 2019-06-12 PROCEDURE — 83036 HEMOGLOBIN GLYCOSYLATED A1C: CPT

## 2019-06-13 LAB
ALBUMIN SERPL BCP-MCNC: 3.8 G/DL (ref 3.5–5.2)
ALP SERPL-CCNC: 77 U/L (ref 55–135)
ALT SERPL W/O P-5'-P-CCNC: 40 U/L (ref 10–44)
ANION GAP SERPL CALC-SCNC: 10 MMOL/L (ref 8–16)
AST SERPL-CCNC: 21 U/L (ref 10–40)
BILIRUB SERPL-MCNC: 0.9 MG/DL (ref 0.1–1)
BUN SERPL-MCNC: 19 MG/DL (ref 8–23)
CALCIUM SERPL-MCNC: 9.6 MG/DL (ref 8.7–10.5)
CHLORIDE SERPL-SCNC: 107 MMOL/L (ref 95–110)
CHOLEST SERPL-MCNC: 192 MG/DL (ref 120–199)
CHOLEST/HDLC SERPL: 4.3 {RATIO} (ref 2–5)
CO2 SERPL-SCNC: 25 MMOL/L (ref 23–29)
CREAT SERPL-MCNC: 0.9 MG/DL (ref 0.5–1.4)
EST. GFR  (AFRICAN AMERICAN): >60 ML/MIN/1.73 M^2
EST. GFR  (NON AFRICAN AMERICAN): >60 ML/MIN/1.73 M^2
ESTIMATED AVG GLUCOSE: 131 MG/DL (ref 68–131)
GLUCOSE SERPL-MCNC: 107 MG/DL (ref 70–110)
HBA1C MFR BLD HPLC: 6.2 % (ref 4–5.6)
HDLC SERPL-MCNC: 45 MG/DL (ref 40–75)
HDLC SERPL: 23.4 % (ref 20–50)
LDLC SERPL CALC-MCNC: 122.4 MG/DL (ref 63–159)
NONHDLC SERPL-MCNC: 147 MG/DL
POTASSIUM SERPL-SCNC: 4.9 MMOL/L (ref 3.5–5.1)
PROT SERPL-MCNC: 6.6 G/DL (ref 6–8.4)
SODIUM SERPL-SCNC: 142 MMOL/L (ref 136–145)
TRIGL SERPL-MCNC: 123 MG/DL (ref 30–150)
TSH SERPL DL<=0.005 MIU/L-ACNC: 0.8 UIU/ML (ref 0.4–4)

## 2019-06-17 ENCOUNTER — TELEPHONE (OUTPATIENT)
Dept: INTERNAL MEDICINE | Facility: CLINIC | Age: 74
End: 2019-06-17

## 2019-06-17 NOTE — TELEPHONE ENCOUNTER
----- Message from Kimberly Rodriguez sent at 6/17/2019  9:59 AM CDT -----  Contact: Patient  Type:  Needs Medical Advice    Who Called: Patient  Symptoms (please be specific):    How long has patient had these symptoms:    Pharmacy name and phone #:    Would the patient rather a call back or a response via [facility]? call  Best Call Back Number: [phone]  Additional Information: Patient has severe constipation and would like to have something called in.

## 2019-06-17 NOTE — TELEPHONE ENCOUNTER
Tell him to get some Mag citrate over-the-counter and drink 1 bottle.  He also needs to get MiraLax and take it every day.  It is also over-the-counter.  All stool softeners are also over-the-counter.  He should get 1 in take it daily.

## 2019-06-25 ENCOUNTER — OFFICE VISIT (OUTPATIENT)
Dept: INTERNAL MEDICINE | Facility: CLINIC | Age: 74
End: 2019-06-25
Payer: MEDICARE

## 2019-06-25 VITALS
HEART RATE: 89 BPM | OXYGEN SATURATION: 96 % | DIASTOLIC BLOOD PRESSURE: 80 MMHG | WEIGHT: 213.88 LBS | HEIGHT: 68 IN | BODY MASS INDEX: 32.41 KG/M2 | TEMPERATURE: 98 F | SYSTOLIC BLOOD PRESSURE: 124 MMHG

## 2019-06-25 DIAGNOSIS — J43.2 CENTRILOBULAR EMPHYSEMA: ICD-10-CM

## 2019-06-25 DIAGNOSIS — E11.9 DIABETES MELLITUS WITHOUT COMPLICATION: ICD-10-CM

## 2019-06-25 DIAGNOSIS — Z86.010 HISTORY OF COLONIC POLYPS: ICD-10-CM

## 2019-06-25 DIAGNOSIS — E78.00 PURE HYPERCHOLESTEROLEMIA: Primary | ICD-10-CM

## 2019-06-25 PROCEDURE — 99214 PR OFFICE/OUTPT VISIT, EST, LEVL IV, 30-39 MIN: ICD-10-PCS | Mod: S$PBB,,, | Performed by: INTERNAL MEDICINE

## 2019-06-25 PROCEDURE — 99999 PR PBB SHADOW E&M-EST. PATIENT-LVL III: ICD-10-PCS | Mod: PBBFAC,,, | Performed by: INTERNAL MEDICINE

## 2019-06-25 PROCEDURE — 99999 PR PBB SHADOW E&M-EST. PATIENT-LVL III: CPT | Mod: PBBFAC,,, | Performed by: INTERNAL MEDICINE

## 2019-06-25 PROCEDURE — 99214 OFFICE O/P EST MOD 30 MIN: CPT | Mod: S$PBB,,, | Performed by: INTERNAL MEDICINE

## 2019-06-25 PROCEDURE — 99213 OFFICE O/P EST LOW 20 MIN: CPT | Mod: PBBFAC,PO | Performed by: INTERNAL MEDICINE

## 2019-06-25 NOTE — PROGRESS NOTES
"HPI:  Patient is a 73 old male who comes for follow-up of his diabetes, lipids, and for his annual physical exam.  He was seen low about 2 weeks ago at the local hospital for abdominal pain. He states that he was FOS.  Paperwork from the emergency room states he also had gallstones.  He states he has had gallstones for long time.  That never been a problem.  He currently is doing.  He has no complaints.      Current MEDS: medcard review, verified and update  Allergies: Per the electronic medical record    Past Medical History:   Diagnosis Date    Diabetes mellitus without complication 12/14/2012    Emphysema of lung     History of colonic polyps     Pure hypercholesterolemia        History reviewed. No pertinent surgical history.    SHx: per the electronic medical record    FHx: recorded in the electronic medical record    ROS:    denies any chest pains or shortness of breath. Denies any nausea, vomiting or diarrhea. Denies any fever, chills or sweats. Denies any change in weight, voice, stool, skin or hair. Denies any dysuria, dyspepsia or dysphagia. Denies any change in vision, hearing or headaches. Denies any swollen lymph nodes or loss of memory.    PE:  /80   Pulse 89   Temp 97.8 °F (36.6 °C) (Tympanic)   Ht 5' 8" (1.727 m)   Wt 97 kg (213 lb 13.5 oz)   SpO2 96%   BMI 32.52 kg/m²   Gen: Well-developed, well-nourished, male, in no acute distress, oriented x3  HEENT: neck is supple, no adenopathy, carotids 2+ equal without bruits, thyroid exam normal size without nodules.  CHEST: clear to auscultation and percussion  CVS: regular rate and rhythm without significant murmur, gallop, or rubs  ABD: soft, benign, no rebound no guarding, no distention.  Bowel sounds are normal.     nontender.  No palpable masses.  No organomegaly and no audible bruits.  RECTAL: no masses.  Prostate 30  Grams without nodules.  EXT: no clubbing, cyanosis, or edema  LYMPH: no cervical, inguinal, or axillary " adenopathy  FEET: no loss of sensation.  No ulcers or pressure sores.  Monofilament testing normal  NEURO: gait normal.  Cranial nerves II- XII intact. No nystagmus.  Speech normal.   Gross motor and sensory unremarkable.    Lab Results   Component Value Date    WBC 5.80 06/12/2019    HGB 15.6 06/12/2019    HCT 48.3 06/12/2019     (L) 06/12/2019    CHOL 192 06/12/2019    TRIG 123 06/12/2019    HDL 45 06/12/2019    ALT 40 06/12/2019    AST 21 06/12/2019     06/12/2019    K 4.9 06/12/2019     06/12/2019    CREATININE 0.9 06/12/2019    BUN 19 06/12/2019    CO2 25 06/12/2019    TSH 0.797 06/12/2019    PSA 0.49 06/12/2019    HGBA1C 6.2 (H) 06/12/2019       Impression:  Gallstones, recent I did episode abdominal pain could have been constipation issues but also coded been cholecystitis.  I like to see the ultrasound report.  Other medical problems below, stable  Patient Active Problem List   Diagnosis    Pure hypercholesterolemia    Centrilobular emphysema    Diabetes mellitus without complication    History of colonic polyps       Plan:   Orders Placed This Encounter    Hemoglobin A1c    Lipid panel    Basic metabolic panel    We will obtain the ultrasound report from the hospital.  Patient will see me again in 6 months with above lab work.  Medications remain the same    This note is generated with speech recognition software and is subject to transcription error and sound alike phrases that may be missed by proofreading.

## 2019-11-13 RX ORDER — ALPRAZOLAM 0.25 MG/1
TABLET ORAL
Qty: 60 TABLET | Refills: 4 | Status: SHIPPED | OUTPATIENT
Start: 2019-11-13 | End: 2020-05-22

## 2019-12-03 ENCOUNTER — LAB VISIT (OUTPATIENT)
Dept: LAB | Facility: HOSPITAL | Age: 74
End: 2019-12-03
Attending: INTERNAL MEDICINE
Payer: MEDICARE

## 2019-12-03 DIAGNOSIS — E11.9 DIABETES MELLITUS WITHOUT COMPLICATION: ICD-10-CM

## 2019-12-03 LAB
ANION GAP SERPL CALC-SCNC: 4 MMOL/L (ref 8–16)
BUN SERPL-MCNC: 17 MG/DL (ref 8–23)
CALCIUM SERPL-MCNC: 9.9 MG/DL (ref 8.7–10.5)
CHLORIDE SERPL-SCNC: 104 MMOL/L (ref 95–110)
CHOLEST SERPL-MCNC: 209 MG/DL (ref 120–199)
CHOLEST/HDLC SERPL: 4.2 {RATIO} (ref 2–5)
CO2 SERPL-SCNC: 31 MMOL/L (ref 23–29)
CREAT SERPL-MCNC: 1 MG/DL (ref 0.5–1.4)
EST. GFR  (AFRICAN AMERICAN): >60 ML/MIN/1.73 M^2
EST. GFR  (NON AFRICAN AMERICAN): >60 ML/MIN/1.73 M^2
ESTIMATED AVG GLUCOSE: 126 MG/DL (ref 68–131)
GLUCOSE SERPL-MCNC: 126 MG/DL (ref 70–110)
HBA1C MFR BLD HPLC: 6 % (ref 4–5.6)
HDLC SERPL-MCNC: 50 MG/DL (ref 40–75)
HDLC SERPL: 23.9 % (ref 20–50)
LDLC SERPL CALC-MCNC: 139.4 MG/DL (ref 63–159)
NONHDLC SERPL-MCNC: 159 MG/DL
POTASSIUM SERPL-SCNC: 5 MMOL/L (ref 3.5–5.1)
SODIUM SERPL-SCNC: 139 MMOL/L (ref 136–145)
TRIGL SERPL-MCNC: 98 MG/DL (ref 30–150)

## 2019-12-03 PROCEDURE — 80061 LIPID PANEL: CPT

## 2019-12-03 PROCEDURE — 36415 COLL VENOUS BLD VENIPUNCTURE: CPT | Mod: PO

## 2019-12-03 PROCEDURE — 80048 BASIC METABOLIC PNL TOTAL CA: CPT

## 2019-12-03 PROCEDURE — 83036 HEMOGLOBIN GLYCOSYLATED A1C: CPT

## 2019-12-09 ENCOUNTER — OFFICE VISIT (OUTPATIENT)
Dept: INTERNAL MEDICINE | Facility: CLINIC | Age: 74
End: 2019-12-09
Payer: MEDICARE

## 2019-12-09 VITALS
HEIGHT: 68 IN | HEART RATE: 92 BPM | OXYGEN SATURATION: 95 % | WEIGHT: 216.69 LBS | SYSTOLIC BLOOD PRESSURE: 132 MMHG | BODY MASS INDEX: 32.84 KG/M2 | DIASTOLIC BLOOD PRESSURE: 68 MMHG | TEMPERATURE: 98 F

## 2019-12-09 DIAGNOSIS — Z12.5 PROSTATE CANCER SCREENING: ICD-10-CM

## 2019-12-09 DIAGNOSIS — E11.9 DIABETES MELLITUS WITHOUT COMPLICATION: Primary | ICD-10-CM

## 2019-12-09 DIAGNOSIS — E78.00 PURE HYPERCHOLESTEROLEMIA: ICD-10-CM

## 2019-12-09 DIAGNOSIS — J43.2 CENTRILOBULAR EMPHYSEMA: ICD-10-CM

## 2019-12-09 DIAGNOSIS — Z86.010 HISTORY OF COLONIC POLYPS: ICD-10-CM

## 2019-12-09 PROCEDURE — 1126F AMNT PAIN NOTED NONE PRSNT: CPT | Mod: ,,, | Performed by: INTERNAL MEDICINE

## 2019-12-09 PROCEDURE — 99213 OFFICE O/P EST LOW 20 MIN: CPT | Mod: PBBFAC,PO | Performed by: INTERNAL MEDICINE

## 2019-12-09 PROCEDURE — 99999 PR PBB SHADOW E&M-EST. PATIENT-LVL III: ICD-10-PCS | Mod: PBBFAC,,, | Performed by: INTERNAL MEDICINE

## 2019-12-09 PROCEDURE — 1159F PR MEDICATION LIST DOCUMENTED IN MEDICAL RECORD: ICD-10-PCS | Mod: ,,, | Performed by: INTERNAL MEDICINE

## 2019-12-09 PROCEDURE — 1126F PR PAIN SEVERITY QUANTIFIED, NO PAIN PRESENT: ICD-10-PCS | Mod: ,,, | Performed by: INTERNAL MEDICINE

## 2019-12-09 PROCEDURE — 90662 IIV NO PRSV INCREASED AG IM: CPT | Mod: PBBFAC,PO

## 2019-12-09 PROCEDURE — 99999 PR PBB SHADOW E&M-EST. PATIENT-LVL III: CPT | Mod: PBBFAC,,, | Performed by: INTERNAL MEDICINE

## 2019-12-09 PROCEDURE — 1159F MED LIST DOCD IN RCRD: CPT | Mod: ,,, | Performed by: INTERNAL MEDICINE

## 2019-12-09 PROCEDURE — 99214 PR OFFICE/OUTPT VISIT, EST, LEVL IV, 30-39 MIN: ICD-10-PCS | Mod: 25,S$PBB,, | Performed by: INTERNAL MEDICINE

## 2019-12-09 PROCEDURE — 99214 OFFICE O/P EST MOD 30 MIN: CPT | Mod: 25,S$PBB,, | Performed by: INTERNAL MEDICINE

## 2019-12-09 RX ORDER — ATORVASTATIN CALCIUM 40 MG/1
40 TABLET, FILM COATED ORAL DAILY
Qty: 90 TABLET | Refills: 3 | Status: SHIPPED | OUTPATIENT
Start: 2019-12-09 | End: 2020-11-29

## 2019-12-09 RX ORDER — PANTOPRAZOLE SODIUM 40 MG/1
40 TABLET, DELAYED RELEASE ORAL DAILY
COMMUNITY
Start: 2019-06-16

## 2019-12-09 NOTE — PROGRESS NOTES
"HPI:  Patient is a 74-year-old man who comes today for follow-up of his diabetes and lipids.  He is Doing well.  He has no problems at this time.  He denies any hypoglycemia.    Current meds have been verified and updated per the EMR  Exam:/68   Pulse 92   Temp 98.1 °F (36.7 °C) (Tympanic)   Ht 5' 8" (1.727 m)   Wt 98.3 kg (216 lb 11.4 oz)   SpO2 95%   BMI 32.95 kg/m²   Carotids 2+ equal without bruits  Chest clear  Cardiovascular regular rate and rhythm without murmur gallop or rub    Lab Results   Component Value Date    WBC 5.80 06/12/2019    HGB 15.6 06/12/2019    HCT 48.3 06/12/2019     (L) 06/12/2019    CHOL 209 (H) 12/03/2019    TRIG 98 12/03/2019    HDL 50 12/03/2019    ALT 40 06/12/2019    AST 21 06/12/2019     12/03/2019    K 5.0 12/03/2019     12/03/2019    CREATININE 1.0 12/03/2019    BUN 17 12/03/2019    CO2 31 (H) 12/03/2019    TSH 0.797 06/12/2019    PSA 0.49 06/12/2019    HGBA1C 6.0 (H) 12/03/2019       Impression:  Hyperlipidemia, not to goal  Other problems below, stable  Patient Active Problem List   Diagnosis    Pure hypercholesterolemia    Centrilobular emphysema    Diabetes mellitus without complication    History of colonic polyps       Plan:  Orders Placed This Encounter    Hemoglobin A1c    Comprehensive metabolic panel    Lipid panel    Protein / creatinine ratio, urine    TSH    CBC auto differential    PSA, Screening    atorvastatin (LIPITOR) 40 MG tablet     He will change from simvastatin to atorvastatin 40 mg at bedtime.  He will be seen again in 6 months with above lab work.  He was given high-dose influenza vaccine today    This note is generated with speech recognition software and is subject to transcription error and sound alike phrases that may be missed by proofreading.    "

## 2020-02-01 RX ORDER — SIMVASTATIN 20 MG/1
TABLET, FILM COATED ORAL
Qty: 30 TABLET | Refills: 11 | Status: SHIPPED | OUTPATIENT
Start: 2020-02-01 | End: 2023-05-31

## 2020-05-22 RX ORDER — ALPRAZOLAM 0.25 MG/1
TABLET ORAL
Qty: 60 TABLET | Refills: 3 | Status: SHIPPED | OUTPATIENT
Start: 2020-05-22 | End: 2020-09-18

## 2020-06-23 ENCOUNTER — LAB VISIT (OUTPATIENT)
Dept: LAB | Facility: HOSPITAL | Age: 75
End: 2020-06-23
Attending: INTERNAL MEDICINE
Payer: MEDICARE

## 2020-06-23 DIAGNOSIS — Z12.5 PROSTATE CANCER SCREENING: ICD-10-CM

## 2020-06-23 DIAGNOSIS — E11.9 DIABETES MELLITUS WITHOUT COMPLICATION: ICD-10-CM

## 2020-06-23 LAB
BASOPHILS # BLD AUTO: 0.03 K/UL (ref 0–0.2)
BASOPHILS NFR BLD: 0.5 % (ref 0–1.9)
DIFFERENTIAL METHOD: ABNORMAL
EOSINOPHIL # BLD AUTO: 0.1 K/UL (ref 0–0.5)
EOSINOPHIL NFR BLD: 0.9 % (ref 0–8)
ERYTHROCYTE [DISTWIDTH] IN BLOOD BY AUTOMATED COUNT: 13.5 % (ref 11.5–14.5)
HCT VFR BLD AUTO: 52.7 % (ref 40–54)
HGB BLD-MCNC: 16.5 G/DL (ref 14–18)
IMM GRANULOCYTES # BLD AUTO: 0.03 K/UL (ref 0–0.04)
IMM GRANULOCYTES NFR BLD AUTO: 0.5 % (ref 0–0.5)
LYMPHOCYTES # BLD AUTO: 1 K/UL (ref 1–4.8)
LYMPHOCYTES NFR BLD: 15.9 % (ref 18–48)
MCH RBC QN AUTO: 28.4 PG (ref 27–31)
MCHC RBC AUTO-ENTMCNC: 31.3 G/DL (ref 32–36)
MCV RBC AUTO: 91 FL (ref 82–98)
MONOCYTES # BLD AUTO: 0.5 K/UL (ref 0.3–1)
MONOCYTES NFR BLD: 7.6 % (ref 4–15)
NEUTROPHILS # BLD AUTO: 4.9 K/UL (ref 1.8–7.7)
NEUTROPHILS NFR BLD: 74.6 % (ref 38–73)
NRBC BLD-RTO: 0 /100 WBC
PLATELET # BLD AUTO: 137 K/UL (ref 150–350)
PMV BLD AUTO: 11.6 FL (ref 9.2–12.9)
RBC # BLD AUTO: 5.8 M/UL (ref 4.6–6.2)
WBC # BLD AUTO: 6.56 K/UL (ref 3.9–12.7)

## 2020-06-23 PROCEDURE — 85025 COMPLETE CBC W/AUTO DIFF WBC: CPT

## 2020-06-23 PROCEDURE — 36415 COLL VENOUS BLD VENIPUNCTURE: CPT | Mod: PO

## 2020-06-23 PROCEDURE — 84443 ASSAY THYROID STIM HORMONE: CPT

## 2020-06-23 PROCEDURE — 84153 ASSAY OF PSA TOTAL: CPT

## 2020-06-23 PROCEDURE — 80053 COMPREHEN METABOLIC PANEL: CPT

## 2020-06-23 PROCEDURE — 80061 LIPID PANEL: CPT

## 2020-06-23 PROCEDURE — 83036 HEMOGLOBIN GLYCOSYLATED A1C: CPT

## 2020-06-24 LAB
ALBUMIN SERPL BCP-MCNC: 4.2 G/DL (ref 3.5–5.2)
ALP SERPL-CCNC: 85 U/L (ref 55–135)
ALT SERPL W/O P-5'-P-CCNC: 45 U/L (ref 10–44)
ANION GAP SERPL CALC-SCNC: 8 MMOL/L (ref 8–16)
AST SERPL-CCNC: 23 U/L (ref 10–40)
BILIRUB SERPL-MCNC: 0.9 MG/DL (ref 0.1–1)
BUN SERPL-MCNC: 14 MG/DL (ref 8–23)
CALCIUM SERPL-MCNC: 9.5 MG/DL (ref 8.7–10.5)
CHLORIDE SERPL-SCNC: 105 MMOL/L (ref 95–110)
CHOLEST SERPL-MCNC: 177 MG/DL (ref 120–199)
CHOLEST/HDLC SERPL: 3.9 {RATIO} (ref 2–5)
CO2 SERPL-SCNC: 27 MMOL/L (ref 23–29)
COMPLEXED PSA SERPL-MCNC: 0.61 NG/ML (ref 0–4)
CREAT SERPL-MCNC: 1 MG/DL (ref 0.5–1.4)
EST. GFR  (AFRICAN AMERICAN): >60 ML/MIN/1.73 M^2
EST. GFR  (NON AFRICAN AMERICAN): >60 ML/MIN/1.73 M^2
ESTIMATED AVG GLUCOSE: 140 MG/DL (ref 68–131)
GLUCOSE SERPL-MCNC: 134 MG/DL (ref 70–110)
HBA1C MFR BLD HPLC: 6.5 % (ref 4–5.6)
HDLC SERPL-MCNC: 45 MG/DL (ref 40–75)
HDLC SERPL: 25.4 % (ref 20–50)
LDLC SERPL CALC-MCNC: 108.6 MG/DL (ref 63–159)
NONHDLC SERPL-MCNC: 132 MG/DL
POTASSIUM SERPL-SCNC: 4.9 MMOL/L (ref 3.5–5.1)
PROT SERPL-MCNC: 7.2 G/DL (ref 6–8.4)
SODIUM SERPL-SCNC: 140 MMOL/L (ref 136–145)
TRIGL SERPL-MCNC: 117 MG/DL (ref 30–150)
TSH SERPL DL<=0.005 MIU/L-ACNC: 1.03 UIU/ML (ref 0.4–4)

## 2020-08-14 ENCOUNTER — OFFICE VISIT (OUTPATIENT)
Dept: INTERNAL MEDICINE | Facility: CLINIC | Age: 75
End: 2020-08-14
Payer: MEDICARE

## 2020-08-14 VITALS
WEIGHT: 220 LBS | OXYGEN SATURATION: 93 % | SYSTOLIC BLOOD PRESSURE: 134 MMHG | TEMPERATURE: 98 F | BODY MASS INDEX: 33.45 KG/M2 | DIASTOLIC BLOOD PRESSURE: 82 MMHG | HEART RATE: 83 BPM

## 2020-08-14 DIAGNOSIS — E78.5 HYPERLIPIDEMIA ASSOCIATED WITH TYPE 2 DIABETES MELLITUS: ICD-10-CM

## 2020-08-14 DIAGNOSIS — E11.9 DIABETES MELLITUS WITHOUT COMPLICATION: ICD-10-CM

## 2020-08-14 DIAGNOSIS — E11.69 HYPERLIPIDEMIA ASSOCIATED WITH TYPE 2 DIABETES MELLITUS: ICD-10-CM

## 2020-08-14 DIAGNOSIS — J43.2 CENTRILOBULAR EMPHYSEMA: ICD-10-CM

## 2020-08-14 DIAGNOSIS — Z86.010 HISTORY OF COLONIC POLYPS: ICD-10-CM

## 2020-08-14 DIAGNOSIS — Z00.00 ROUTINE GENERAL MEDICAL EXAMINATION AT A HEALTH CARE FACILITY: Primary | ICD-10-CM

## 2020-08-14 DIAGNOSIS — E78.00 PURE HYPERCHOLESTEROLEMIA: ICD-10-CM

## 2020-08-14 PROCEDURE — 99999 PR PBB SHADOW E&M-EST. PATIENT-LVL III: CPT | Mod: PBBFAC,,, | Performed by: INTERNAL MEDICINE

## 2020-08-14 PROCEDURE — 99214 PR OFFICE/OUTPT VISIT, EST, LEVL IV, 30-39 MIN: ICD-10-PCS | Mod: S$PBB,,, | Performed by: INTERNAL MEDICINE

## 2020-08-14 PROCEDURE — 99213 OFFICE O/P EST LOW 20 MIN: CPT | Mod: PBBFAC,PO | Performed by: INTERNAL MEDICINE

## 2020-08-14 PROCEDURE — 99999 PR PBB SHADOW E&M-EST. PATIENT-LVL III: ICD-10-PCS | Mod: PBBFAC,,, | Performed by: INTERNAL MEDICINE

## 2020-08-14 PROCEDURE — 99214 OFFICE O/P EST MOD 30 MIN: CPT | Mod: S$PBB,,, | Performed by: INTERNAL MEDICINE

## 2020-08-14 NOTE — PROGRESS NOTES
HPI:  Patient is a 75 year old who comes for follow-up of his diabetes, lipids, and for his annual physical exam.  Patient has been doing well.  He has no complaints.  His sugars have been doing well.  He denies any hypoglycemia.      Current MEDS: medcard review, verified and update  Allergies: Per the electronic medical record    Past Medical History:   Diagnosis Date    Diabetes mellitus without complication 12/14/2012    Emphysema of lung     History of colonic polyps     Hyperlipidemia associated with type 2 diabetes mellitus        History reviewed. No pertinent surgical history.    SHx: per the electronic medical record    FHx: recorded in the electronic medical record    ROS:    denies any chest pains or shortness of breath. Denies any nausea, vomiting or diarrhea. Denies any fever, chills or sweats. Denies any change in weight, voice, stool, skin or hair. Denies any dysuria, dyspepsia or dysphagia. Denies any change in vision, hearing or headaches. Denies any swollen lymph nodes or loss of memory.    PE:  /82   Pulse 83   Temp 98.1 °F (36.7 °C) (Tympanic)   Wt 99.8 kg (220 lb 0.3 oz)   SpO2 (!) 93%   BMI 33.45 kg/m²   Gen: Well-developed, well-nourished, male, in no acute distress, oriented x3  HEENT: neck is supple, no adenopathy, carotids 2+ equal without bruits, thyroid exam normal size without nodules.  CHEST: clear to auscultation and percussion  CVS: regular rate and rhythm without significant murmur, gallop, or rubs  ABD: soft, benign, no rebound no guarding, no distention.  Bowel sounds are normal.     nontender.  No palpable masses.  No organomegaly and no audible bruits.  RECTAL:  Deferred  EXT: no clubbing, cyanosis, or edema  LYMPH: no cervical, inguinal, or axillary adenopathy  FEET: no loss of sensation.  No ulcers or pressure sores.  Monofilament testing normal  NEURO: gait normal.  Cranial nerves II- XII intact. No nystagmus.  Speech normal.   Gross motor and sensory  unremarkable.    Lab Results   Component Value Date    WBC 6.56 06/23/2020    HGB 16.5 06/23/2020    HCT 52.7 06/23/2020     (L) 06/23/2020    CHOL 177 06/23/2020    TRIG 117 06/23/2020    HDL 45 06/23/2020    ALT 45 (H) 06/23/2020    AST 23 06/23/2020     06/23/2020    K 4.9 06/23/2020     06/23/2020    CREATININE 1.0 06/23/2020    BUN 14 06/23/2020    CO2 27 06/23/2020    TSH 1.031 06/23/2020    PSA 0.61 06/23/2020    HGBA1C 6.5 (H) 06/23/2020       Impression:  Multiple medical problems below, stable  Patient Active Problem List   Diagnosis    Centrilobular emphysema    Diabetes mellitus without complication    History of colonic polyps    Hyperlipidemia associated with type 2 diabetes mellitus       Plan:   Orders Placed This Encounter    Hemoglobin A1C    Lipid Panel    Basic metabolic panel    Protein / creatinine ratio, urine    TSH     Medications remain the same.  He will be seen again in 6 months with above lab work  This note is generated with speech recognition software and is subject to transcription error and sound alike phrases that may be missed by proofreading.

## 2020-11-13 ENCOUNTER — PES CALL (OUTPATIENT)
Dept: ADMINISTRATIVE | Facility: CLINIC | Age: 75
End: 2020-11-13

## 2021-02-01 ENCOUNTER — PES CALL (OUTPATIENT)
Dept: ADMINISTRATIVE | Facility: CLINIC | Age: 76
End: 2021-02-01

## 2021-02-09 ENCOUNTER — LAB VISIT (OUTPATIENT)
Dept: LAB | Facility: HOSPITAL | Age: 76
End: 2021-02-09
Attending: INTERNAL MEDICINE
Payer: MEDICARE

## 2021-02-09 DIAGNOSIS — E11.9 DIABETES MELLITUS WITHOUT COMPLICATION: ICD-10-CM

## 2021-02-09 LAB
ANION GAP SERPL CALC-SCNC: 10 MMOL/L (ref 8–16)
BUN SERPL-MCNC: 18 MG/DL (ref 8–23)
CALCIUM SERPL-MCNC: 9.2 MG/DL (ref 8.7–10.5)
CHLORIDE SERPL-SCNC: 104 MMOL/L (ref 95–110)
CHOLEST SERPL-MCNC: 154 MG/DL (ref 120–199)
CHOLEST/HDLC SERPL: 4.1 {RATIO} (ref 2–5)
CO2 SERPL-SCNC: 27 MMOL/L (ref 23–29)
CREAT SERPL-MCNC: 1 MG/DL (ref 0.5–1.4)
EST. GFR  (AFRICAN AMERICAN): >60 ML/MIN/1.73 M^2
EST. GFR  (NON AFRICAN AMERICAN): >60 ML/MIN/1.73 M^2
ESTIMATED AVG GLUCOSE: 157 MG/DL (ref 68–131)
GLUCOSE SERPL-MCNC: 143 MG/DL (ref 70–110)
HBA1C MFR BLD: 7.1 % (ref 4–5.6)
HDLC SERPL-MCNC: 38 MG/DL (ref 40–75)
HDLC SERPL: 24.7 % (ref 20–50)
LDLC SERPL CALC-MCNC: 97 MG/DL (ref 63–159)
NONHDLC SERPL-MCNC: 116 MG/DL
POTASSIUM SERPL-SCNC: 4.3 MMOL/L (ref 3.5–5.1)
SODIUM SERPL-SCNC: 141 MMOL/L (ref 136–145)
TRIGL SERPL-MCNC: 95 MG/DL (ref 30–150)
TSH SERPL DL<=0.005 MIU/L-ACNC: 0.98 UIU/ML (ref 0.4–4)

## 2021-02-09 PROCEDURE — 84443 ASSAY THYROID STIM HORMONE: CPT

## 2021-02-09 PROCEDURE — 83036 HEMOGLOBIN GLYCOSYLATED A1C: CPT

## 2021-02-09 PROCEDURE — 80048 BASIC METABOLIC PNL TOTAL CA: CPT

## 2021-02-09 PROCEDURE — 80061 LIPID PANEL: CPT

## 2021-02-09 PROCEDURE — 36415 COLL VENOUS BLD VENIPUNCTURE: CPT | Mod: PO

## 2021-03-25 ENCOUNTER — OFFICE VISIT (OUTPATIENT)
Dept: INTERNAL MEDICINE | Facility: CLINIC | Age: 76
End: 2021-03-25
Payer: MEDICARE

## 2021-03-25 VITALS
HEART RATE: 109 BPM | TEMPERATURE: 98 F | HEIGHT: 68 IN | BODY MASS INDEX: 33.98 KG/M2 | OXYGEN SATURATION: 95 % | SYSTOLIC BLOOD PRESSURE: 154 MMHG | DIASTOLIC BLOOD PRESSURE: 76 MMHG | WEIGHT: 224.19 LBS

## 2021-03-25 DIAGNOSIS — E78.5 HYPERLIPIDEMIA ASSOCIATED WITH TYPE 2 DIABETES MELLITUS: Primary | ICD-10-CM

## 2021-03-25 DIAGNOSIS — J43.2 CENTRILOBULAR EMPHYSEMA: ICD-10-CM

## 2021-03-25 DIAGNOSIS — Z12.5 PROSTATE CANCER SCREENING: ICD-10-CM

## 2021-03-25 DIAGNOSIS — E11.69 HYPERLIPIDEMIA ASSOCIATED WITH TYPE 2 DIABETES MELLITUS: Primary | ICD-10-CM

## 2021-03-25 DIAGNOSIS — E11.9 DIABETES MELLITUS WITHOUT COMPLICATION: ICD-10-CM

## 2021-03-25 DIAGNOSIS — Z86.010 HISTORY OF COLONIC POLYPS: ICD-10-CM

## 2021-03-25 PROCEDURE — 99999 PR PBB SHADOW E&M-EST. PATIENT-LVL IV: ICD-10-PCS | Mod: PBBFAC,,, | Performed by: INTERNAL MEDICINE

## 2021-03-25 PROCEDURE — 99999 PR PBB SHADOW E&M-EST. PATIENT-LVL IV: CPT | Mod: PBBFAC,,, | Performed by: INTERNAL MEDICINE

## 2021-03-25 PROCEDURE — 99214 OFFICE O/P EST MOD 30 MIN: CPT | Mod: S$PBB,,, | Performed by: INTERNAL MEDICINE

## 2021-03-25 PROCEDURE — 99214 OFFICE O/P EST MOD 30 MIN: CPT | Mod: PBBFAC,PO | Performed by: INTERNAL MEDICINE

## 2021-03-25 PROCEDURE — 99214 PR OFFICE/OUTPT VISIT, EST, LEVL IV, 30-39 MIN: ICD-10-PCS | Mod: S$PBB,,, | Performed by: INTERNAL MEDICINE

## 2021-03-31 ENCOUNTER — PES CALL (OUTPATIENT)
Dept: ADMINISTRATIVE | Facility: CLINIC | Age: 76
End: 2021-03-31

## 2021-04-26 ENCOUNTER — OFFICE VISIT (OUTPATIENT)
Dept: INTERNAL MEDICINE | Facility: CLINIC | Age: 76
End: 2021-04-26
Payer: MEDICARE

## 2021-04-26 VITALS
WEIGHT: 227.31 LBS | BODY MASS INDEX: 34.45 KG/M2 | OXYGEN SATURATION: 95 % | TEMPERATURE: 98 F | DIASTOLIC BLOOD PRESSURE: 78 MMHG | SYSTOLIC BLOOD PRESSURE: 138 MMHG | HEART RATE: 92 BPM | HEIGHT: 68 IN

## 2021-04-26 DIAGNOSIS — E11.9 DIABETES MELLITUS WITHOUT COMPLICATION: Primary | ICD-10-CM

## 2021-04-26 DIAGNOSIS — E78.5 HYPERLIPIDEMIA ASSOCIATED WITH TYPE 2 DIABETES MELLITUS: ICD-10-CM

## 2021-04-26 DIAGNOSIS — E11.69 HYPERLIPIDEMIA ASSOCIATED WITH TYPE 2 DIABETES MELLITUS: ICD-10-CM

## 2021-04-26 PROCEDURE — 99999 PR PBB SHADOW E&M-EST. PATIENT-LVL III: CPT | Mod: PBBFAC,,, | Performed by: INTERNAL MEDICINE

## 2021-04-26 PROCEDURE — 99213 OFFICE O/P EST LOW 20 MIN: CPT | Mod: PBBFAC,PO | Performed by: INTERNAL MEDICINE

## 2021-04-26 PROCEDURE — 99999 PR PBB SHADOW E&M-EST. PATIENT-LVL III: ICD-10-PCS | Mod: PBBFAC,,, | Performed by: INTERNAL MEDICINE

## 2021-04-26 PROCEDURE — 99213 PR OFFICE/OUTPT VISIT, EST, LEVL III, 20-29 MIN: ICD-10-PCS | Mod: S$PBB,,, | Performed by: INTERNAL MEDICINE

## 2021-04-26 PROCEDURE — 99213 OFFICE O/P EST LOW 20 MIN: CPT | Mod: S$PBB,,, | Performed by: INTERNAL MEDICINE

## 2021-05-17 ENCOUNTER — TELEPHONE (OUTPATIENT)
Dept: ADMINISTRATIVE | Facility: HOSPITAL | Age: 76
End: 2021-05-17

## 2021-07-16 ENCOUNTER — TELEPHONE (OUTPATIENT)
Dept: ADMINISTRATIVE | Facility: HOSPITAL | Age: 76
End: 2021-07-16

## 2021-07-19 ENCOUNTER — PATIENT OUTREACH (OUTPATIENT)
Dept: ADMINISTRATIVE | Facility: HOSPITAL | Age: 76
End: 2021-07-19

## 2021-08-20 ENCOUNTER — TELEPHONE (OUTPATIENT)
Dept: ADMINISTRATIVE | Facility: HOSPITAL | Age: 76
End: 2021-08-20

## 2021-09-10 ENCOUNTER — TELEPHONE (OUTPATIENT)
Dept: ADMINISTRATIVE | Facility: HOSPITAL | Age: 76
End: 2021-09-10

## 2021-09-20 ENCOUNTER — LAB VISIT (OUTPATIENT)
Dept: LAB | Facility: HOSPITAL | Age: 76
End: 2021-09-20
Attending: INTERNAL MEDICINE
Payer: MEDICARE

## 2021-09-20 DIAGNOSIS — E11.9 DIABETES MELLITUS WITHOUT COMPLICATION: ICD-10-CM

## 2021-09-20 DIAGNOSIS — Z12.5 PROSTATE CANCER SCREENING: ICD-10-CM

## 2021-09-20 PROCEDURE — 80053 COMPREHEN METABOLIC PANEL: CPT | Performed by: INTERNAL MEDICINE

## 2021-09-20 PROCEDURE — 85025 COMPLETE CBC W/AUTO DIFF WBC: CPT | Performed by: INTERNAL MEDICINE

## 2021-09-20 PROCEDURE — 80061 LIPID PANEL: CPT | Performed by: INTERNAL MEDICINE

## 2021-09-20 PROCEDURE — 84153 ASSAY OF PSA TOTAL: CPT | Performed by: INTERNAL MEDICINE

## 2021-09-20 PROCEDURE — 84443 ASSAY THYROID STIM HORMONE: CPT | Performed by: INTERNAL MEDICINE

## 2021-09-20 PROCEDURE — 83036 HEMOGLOBIN GLYCOSYLATED A1C: CPT | Performed by: INTERNAL MEDICINE

## 2021-09-20 PROCEDURE — 36415 COLL VENOUS BLD VENIPUNCTURE: CPT | Mod: PO | Performed by: INTERNAL MEDICINE

## 2021-09-21 LAB
ALBUMIN SERPL BCP-MCNC: 3.9 G/DL (ref 3.5–5.2)
ALP SERPL-CCNC: 79 U/L (ref 55–135)
ALT SERPL W/O P-5'-P-CCNC: 33 U/L (ref 10–44)
ANION GAP SERPL CALC-SCNC: 15 MMOL/L (ref 8–16)
AST SERPL-CCNC: 18 U/L (ref 10–40)
BASOPHILS # BLD AUTO: 0.03 K/UL (ref 0–0.2)
BASOPHILS NFR BLD: 0.5 % (ref 0–1.9)
BILIRUB SERPL-MCNC: 1 MG/DL (ref 0.1–1)
BUN SERPL-MCNC: 16 MG/DL (ref 8–23)
CALCIUM SERPL-MCNC: 9.3 MG/DL (ref 8.7–10.5)
CHLORIDE SERPL-SCNC: 105 MMOL/L (ref 95–110)
CHOLEST SERPL-MCNC: 150 MG/DL (ref 120–199)
CHOLEST/HDLC SERPL: 3.8 {RATIO} (ref 2–5)
CO2 SERPL-SCNC: 22 MMOL/L (ref 23–29)
COMPLEXED PSA SERPL-MCNC: 0.59 NG/ML (ref 0–4)
CREAT SERPL-MCNC: 1 MG/DL (ref 0.5–1.4)
DIFFERENTIAL METHOD: ABNORMAL
EOSINOPHIL # BLD AUTO: 0.1 K/UL (ref 0–0.5)
EOSINOPHIL NFR BLD: 1.7 % (ref 0–8)
ERYTHROCYTE [DISTWIDTH] IN BLOOD BY AUTOMATED COUNT: 13.8 % (ref 11.5–14.5)
EST. GFR  (AFRICAN AMERICAN): >60 ML/MIN/1.73 M^2
EST. GFR  (NON AFRICAN AMERICAN): >60 ML/MIN/1.73 M^2
ESTIMATED AVG GLUCOSE: 148 MG/DL (ref 68–131)
GLUCOSE SERPL-MCNC: 127 MG/DL (ref 70–110)
HBA1C MFR BLD: 6.8 % (ref 4–5.6)
HCT VFR BLD AUTO: 50.7 % (ref 40–54)
HDLC SERPL-MCNC: 40 MG/DL (ref 40–75)
HDLC SERPL: 26.7 % (ref 20–50)
HGB BLD-MCNC: 16.3 G/DL (ref 14–18)
IMM GRANULOCYTES # BLD AUTO: 0.04 K/UL (ref 0–0.04)
IMM GRANULOCYTES NFR BLD AUTO: 0.7 % (ref 0–0.5)
LDLC SERPL CALC-MCNC: 93.6 MG/DL (ref 63–159)
LYMPHOCYTES # BLD AUTO: 1 K/UL (ref 1–4.8)
LYMPHOCYTES NFR BLD: 16.7 % (ref 18–48)
MCH RBC QN AUTO: 29.1 PG (ref 27–31)
MCHC RBC AUTO-ENTMCNC: 32.1 G/DL (ref 32–36)
MCV RBC AUTO: 90 FL (ref 82–98)
MONOCYTES # BLD AUTO: 0.5 K/UL (ref 0.3–1)
MONOCYTES NFR BLD: 8.1 % (ref 4–15)
NEUTROPHILS # BLD AUTO: 4.3 K/UL (ref 1.8–7.7)
NEUTROPHILS NFR BLD: 72.3 % (ref 38–73)
NONHDLC SERPL-MCNC: 110 MG/DL
NRBC BLD-RTO: 0 /100 WBC
PLATELET # BLD AUTO: 126 K/UL (ref 150–450)
PMV BLD AUTO: 11.3 FL (ref 9.2–12.9)
POTASSIUM SERPL-SCNC: 4.4 MMOL/L (ref 3.5–5.1)
PROT SERPL-MCNC: 6.6 G/DL (ref 6–8.4)
RBC # BLD AUTO: 5.61 M/UL (ref 4.6–6.2)
SODIUM SERPL-SCNC: 142 MMOL/L (ref 136–145)
TRIGL SERPL-MCNC: 82 MG/DL (ref 30–150)
TSH SERPL DL<=0.005 MIU/L-ACNC: 0.98 UIU/ML (ref 0.4–4)
WBC # BLD AUTO: 5.93 K/UL (ref 3.9–12.7)

## 2021-09-27 ENCOUNTER — OFFICE VISIT (OUTPATIENT)
Dept: INTERNAL MEDICINE | Facility: CLINIC | Age: 76
End: 2021-09-27
Payer: MEDICARE

## 2021-09-27 VITALS
OXYGEN SATURATION: 94 % | WEIGHT: 224.19 LBS | HEIGHT: 70 IN | HEART RATE: 94 BPM | SYSTOLIC BLOOD PRESSURE: 134 MMHG | BODY MASS INDEX: 32.1 KG/M2 | DIASTOLIC BLOOD PRESSURE: 84 MMHG

## 2021-09-27 DIAGNOSIS — Z86.010 HISTORY OF COLONIC POLYPS: ICD-10-CM

## 2021-09-27 DIAGNOSIS — E78.5 HYPERLIPIDEMIA ASSOCIATED WITH TYPE 2 DIABETES MELLITUS: Primary | ICD-10-CM

## 2021-09-27 DIAGNOSIS — E11.9 DIABETES MELLITUS WITHOUT COMPLICATION: ICD-10-CM

## 2021-09-27 DIAGNOSIS — J43.2 CENTRILOBULAR EMPHYSEMA: ICD-10-CM

## 2021-09-27 DIAGNOSIS — E11.69 HYPERLIPIDEMIA ASSOCIATED WITH TYPE 2 DIABETES MELLITUS: Primary | ICD-10-CM

## 2021-09-27 PROCEDURE — 99999 PR PBB SHADOW E&M-EST. PATIENT-LVL III: CPT | Mod: PBBFAC,,, | Performed by: INTERNAL MEDICINE

## 2021-09-27 PROCEDURE — 99213 OFFICE O/P EST LOW 20 MIN: CPT | Mod: PBBFAC,PO,25 | Performed by: INTERNAL MEDICINE

## 2021-09-27 PROCEDURE — 99214 OFFICE O/P EST MOD 30 MIN: CPT | Mod: 25,S$PBB,, | Performed by: INTERNAL MEDICINE

## 2021-09-27 PROCEDURE — 99999 PR PBB SHADOW E&M-EST. PATIENT-LVL III: ICD-10-PCS | Mod: PBBFAC,,, | Performed by: INTERNAL MEDICINE

## 2021-09-27 PROCEDURE — G0008 ADMIN INFLUENZA VIRUS VAC: HCPCS | Mod: PBBFAC,PO

## 2021-09-27 PROCEDURE — 90694 VACC AIIV4 NO PRSRV 0.5ML IM: CPT | Mod: PBBFAC,PO

## 2021-09-27 PROCEDURE — 99214 PR OFFICE/OUTPT VISIT, EST, LEVL IV, 30-39 MIN: ICD-10-PCS | Mod: 25,S$PBB,, | Performed by: INTERNAL MEDICINE

## 2021-12-22 RX ORDER — ALPRAZOLAM 0.25 MG/1
TABLET ORAL
Qty: 60 TABLET | Refills: 5 | Status: SHIPPED | OUTPATIENT
Start: 2021-12-22 | End: 2022-08-01

## 2022-03-21 ENCOUNTER — PATIENT OUTREACH (OUTPATIENT)
Dept: ADMINISTRATIVE | Facility: HOSPITAL | Age: 77
End: 2022-03-21
Payer: MEDICARE

## 2022-03-21 DIAGNOSIS — E11.9 DIABETES MELLITUS WITHOUT COMPLICATION: Primary | ICD-10-CM

## 2022-03-22 ENCOUNTER — TELEPHONE (OUTPATIENT)
Dept: ADMINISTRATIVE | Facility: HOSPITAL | Age: 77
End: 2022-03-22
Payer: MEDICARE

## 2022-04-20 ENCOUNTER — LAB VISIT (OUTPATIENT)
Dept: LAB | Facility: HOSPITAL | Age: 77
End: 2022-04-20
Attending: INTERNAL MEDICINE
Payer: MEDICARE

## 2022-04-20 DIAGNOSIS — E11.9 DIABETES MELLITUS WITHOUT COMPLICATION: ICD-10-CM

## 2022-04-20 LAB
ALBUMIN SERPL BCP-MCNC: 4 G/DL (ref 3.5–5.2)
ALP SERPL-CCNC: 75 U/L (ref 55–135)
ALT SERPL W/O P-5'-P-CCNC: 31 U/L (ref 10–44)
ANION GAP SERPL CALC-SCNC: 11 MMOL/L (ref 8–16)
AST SERPL-CCNC: 17 U/L (ref 10–40)
BILIRUB SERPL-MCNC: 1 MG/DL (ref 0.1–1)
BUN SERPL-MCNC: 17 MG/DL (ref 8–23)
CALCIUM SERPL-MCNC: 9.4 MG/DL (ref 8.7–10.5)
CHLORIDE SERPL-SCNC: 104 MMOL/L (ref 95–110)
CHOLEST SERPL-MCNC: 150 MG/DL (ref 120–199)
CHOLEST/HDLC SERPL: 3.8 {RATIO} (ref 2–5)
CO2 SERPL-SCNC: 28 MMOL/L (ref 23–29)
CREAT SERPL-MCNC: 0.9 MG/DL (ref 0.5–1.4)
EST. GFR  (AFRICAN AMERICAN): >60 ML/MIN/1.73 M^2
EST. GFR  (NON AFRICAN AMERICAN): >60 ML/MIN/1.73 M^2
ESTIMATED AVG GLUCOSE: 174 MG/DL (ref 68–131)
GLUCOSE SERPL-MCNC: 143 MG/DL (ref 70–110)
HBA1C MFR BLD: 7.7 % (ref 4–5.6)
HDLC SERPL-MCNC: 39 MG/DL (ref 40–75)
HDLC SERPL: 26 % (ref 20–50)
LDLC SERPL CALC-MCNC: 92.6 MG/DL (ref 63–159)
NONHDLC SERPL-MCNC: 111 MG/DL
POTASSIUM SERPL-SCNC: 4.1 MMOL/L (ref 3.5–5.1)
PROT SERPL-MCNC: 6.9 G/DL (ref 6–8.4)
SODIUM SERPL-SCNC: 143 MMOL/L (ref 136–145)
TRIGL SERPL-MCNC: 92 MG/DL (ref 30–150)
TSH SERPL DL<=0.005 MIU/L-ACNC: 1.25 UIU/ML (ref 0.4–4)

## 2022-04-20 PROCEDURE — 80061 LIPID PANEL: CPT | Performed by: INTERNAL MEDICINE

## 2022-04-20 PROCEDURE — 83036 HEMOGLOBIN GLYCOSYLATED A1C: CPT | Performed by: INTERNAL MEDICINE

## 2022-04-20 PROCEDURE — 36415 COLL VENOUS BLD VENIPUNCTURE: CPT | Mod: PO | Performed by: INTERNAL MEDICINE

## 2022-04-20 PROCEDURE — 84443 ASSAY THYROID STIM HORMONE: CPT | Performed by: INTERNAL MEDICINE

## 2022-04-20 PROCEDURE — 80053 COMPREHEN METABOLIC PANEL: CPT | Performed by: INTERNAL MEDICINE

## 2022-04-27 ENCOUNTER — OFFICE VISIT (OUTPATIENT)
Dept: INTERNAL MEDICINE | Facility: CLINIC | Age: 77
End: 2022-04-27
Payer: MEDICARE

## 2022-04-27 VITALS
SYSTOLIC BLOOD PRESSURE: 130 MMHG | DIASTOLIC BLOOD PRESSURE: 82 MMHG | OXYGEN SATURATION: 95 % | HEIGHT: 70 IN | HEART RATE: 89 BPM | WEIGHT: 226 LBS | BODY MASS INDEX: 32.35 KG/M2

## 2022-04-27 DIAGNOSIS — E78.5 HYPERLIPIDEMIA ASSOCIATED WITH TYPE 2 DIABETES MELLITUS: Primary | ICD-10-CM

## 2022-04-27 DIAGNOSIS — J43.2 CENTRILOBULAR EMPHYSEMA: ICD-10-CM

## 2022-04-27 DIAGNOSIS — Z86.010 HISTORY OF COLONIC POLYPS: ICD-10-CM

## 2022-04-27 DIAGNOSIS — E11.9 DIABETES MELLITUS WITHOUT COMPLICATION: ICD-10-CM

## 2022-04-27 DIAGNOSIS — E11.69 HYPERLIPIDEMIA ASSOCIATED WITH TYPE 2 DIABETES MELLITUS: Primary | ICD-10-CM

## 2022-04-27 PROCEDURE — 99213 OFFICE O/P EST LOW 20 MIN: CPT | Mod: PBBFAC,PO | Performed by: INTERNAL MEDICINE

## 2022-04-27 PROCEDURE — 99214 PR OFFICE/OUTPT VISIT, EST, LEVL IV, 30-39 MIN: ICD-10-PCS | Mod: S$PBB,,, | Performed by: INTERNAL MEDICINE

## 2022-04-27 PROCEDURE — 99999 PR PBB SHADOW E&M-EST. PATIENT-LVL III: CPT | Mod: PBBFAC,,, | Performed by: INTERNAL MEDICINE

## 2022-04-27 PROCEDURE — 99999 PR PBB SHADOW E&M-EST. PATIENT-LVL III: ICD-10-PCS | Mod: PBBFAC,,, | Performed by: INTERNAL MEDICINE

## 2022-04-27 PROCEDURE — 99214 OFFICE O/P EST MOD 30 MIN: CPT | Mod: S$PBB,,, | Performed by: INTERNAL MEDICINE

## 2022-04-27 RX ORDER — METFORMIN HYDROCHLORIDE 500 MG/1
500 TABLET ORAL 2 TIMES DAILY WITH MEALS
Qty: 180 TABLET | Refills: 3 | Status: SHIPPED | OUTPATIENT
Start: 2022-04-27 | End: 2023-05-31 | Stop reason: SDUPTHER

## 2022-04-27 NOTE — PROGRESS NOTES
"HPI:  Patient is a 76-year-old man who comes today for follow-up of diabetes, lipids, his and COPD.  He has been doing well.  He has no reported problems or complaints.  He denies any trouble with shortness of breath.    Current meds have been verified and updated per the EMR  Exam:/82 (BP Location: Right arm)   Pulse 89   Ht 5' 10" (1.778 m)   Wt 102.5 kg (225 lb 15.5 oz)   SpO2 95%   BMI 32.42 kg/m²   Carotids 2+ equal without bruits  Chest clear  Cardiovascular regular rate and rhythm without murmur gallop or rub    Lab Results   Component Value Date    WBC 5.93 09/20/2021    HGB 16.3 09/20/2021    HCT 50.7 09/20/2021     (L) 09/20/2021    CHOL 150 04/20/2022    TRIG 92 04/20/2022    HDL 39 (L) 04/20/2022    ALT 31 04/20/2022    AST 17 04/20/2022     04/20/2022    K 4.1 04/20/2022     04/20/2022    CREATININE 0.9 04/20/2022    BUN 17 04/20/2022    CO2 28 04/20/2022    TSH 1.249 04/20/2022    PSA 0.59 09/20/2021    HGBA1C 7.7 (H) 04/20/2022       Impression:  Diabetes, not as well controlled as they have been in the past.  Other medical problems below, stable  Patient Active Problem List   Diagnosis    Centrilobular emphysema    Diabetes mellitus without complication    History of colonic polyps    Hyperlipidemia associated with type 2 diabetes mellitus       Plan:  Orders Placed This Encounter    Hemoglobin A1C    Comprehensive Metabolic Panel    Lipid Panel    TSH    metFORMIN (GLUCOPHAGE) 500 MG tablet     He was started on metformin 500 mg twice a day.  He was also encouraged to decrease the simple carbohydrates in his diet.  He will be seen again in 6 months with above lab work.    This note is generated with speech recognition software and is subject to transcription error and sound alike phrases that may be missed by proofreading.      "

## 2022-07-08 ENCOUNTER — TELEPHONE (OUTPATIENT)
Dept: ADMINISTRATIVE | Facility: HOSPITAL | Age: 77
End: 2022-07-08
Payer: MEDICARE

## 2022-07-31 NOTE — TELEPHONE ENCOUNTER
Care Due:                  Date            Visit Type   Department     Provider  --------------------------------------------------------------------------------                                EP -                              PRIMARY      [facility] INTERNAL  Last Visit: 04-      CARE ([facility])   MEDICINE       Minor Pandey                              [facility]                              PRIMARY      [facility] INTERNAL  Next Visit: 10-      CARE ([facility])   SANTANA Pandey                                                            Last  Test          Frequency    Reason                     Performed    Due Date  --------------------------------------------------------------------------------    HBA1C.......  6 months...  metFORMIN................  04-   10-    [facility] Embedded Care Gaps. Reference number: [id number]. 7/31/2022   1:45:50 PM CDT

## 2022-08-01 RX ORDER — ALPRAZOLAM 0.25 MG/1
TABLET ORAL
Qty: 60 TABLET | Refills: 5 | Status: SHIPPED | OUTPATIENT
Start: 2022-08-01 | End: 2022-08-02

## 2022-08-02 RX ORDER — ALPRAZOLAM 0.25 MG/1
TABLET ORAL
Qty: 60 TABLET | Refills: 5 | Status: SHIPPED | OUTPATIENT
Start: 2022-08-02 | End: 2023-02-27

## 2022-08-02 NOTE — TELEPHONE ENCOUNTER
No new care gaps identified.  [facility] Embedded Care Gaps. Reference number: [id number]. 8/02/2022   10:25:05 AM CELIOT

## 2022-10-20 ENCOUNTER — TELEPHONE (OUTPATIENT)
Dept: ADMINISTRATIVE | Facility: HOSPITAL | Age: 77
End: 2022-10-20
Payer: MEDICARE

## 2022-10-21 ENCOUNTER — TELEPHONE (OUTPATIENT)
Dept: INTERNAL MEDICINE | Facility: CLINIC | Age: 77
End: 2022-10-21
Payer: MEDICARE

## 2022-11-02 ENCOUNTER — LAB VISIT (OUTPATIENT)
Dept: LAB | Facility: HOSPITAL | Age: 77
End: 2022-11-02
Attending: INTERNAL MEDICINE
Payer: MEDICARE

## 2022-11-02 DIAGNOSIS — E11.9 DIABETES MELLITUS WITHOUT COMPLICATION: ICD-10-CM

## 2022-11-02 PROCEDURE — 80061 LIPID PANEL: CPT | Performed by: INTERNAL MEDICINE

## 2022-11-02 PROCEDURE — 84443 ASSAY THYROID STIM HORMONE: CPT | Performed by: INTERNAL MEDICINE

## 2022-11-02 PROCEDURE — 80053 COMPREHEN METABOLIC PANEL: CPT | Performed by: INTERNAL MEDICINE

## 2022-11-02 PROCEDURE — 36415 COLL VENOUS BLD VENIPUNCTURE: CPT | Mod: PO | Performed by: INTERNAL MEDICINE

## 2022-11-02 PROCEDURE — 83036 HEMOGLOBIN GLYCOSYLATED A1C: CPT | Performed by: INTERNAL MEDICINE

## 2022-11-03 LAB
ALBUMIN SERPL BCP-MCNC: 3.9 G/DL (ref 3.5–5.2)
ALP SERPL-CCNC: 79 U/L (ref 55–135)
ALT SERPL W/O P-5'-P-CCNC: 28 U/L (ref 10–44)
ANION GAP SERPL CALC-SCNC: 12 MMOL/L (ref 8–16)
AST SERPL-CCNC: 19 U/L (ref 10–40)
BILIRUB SERPL-MCNC: 0.9 MG/DL (ref 0.1–1)
BUN SERPL-MCNC: 11 MG/DL (ref 8–23)
CALCIUM SERPL-MCNC: 8.9 MG/DL (ref 8.7–10.5)
CHLORIDE SERPL-SCNC: 105 MMOL/L (ref 95–110)
CHOLEST SERPL-MCNC: 135 MG/DL (ref 120–199)
CHOLEST/HDLC SERPL: 3.3 {RATIO} (ref 2–5)
CO2 SERPL-SCNC: 22 MMOL/L (ref 23–29)
CREAT SERPL-MCNC: 0.9 MG/DL (ref 0.5–1.4)
EST. GFR  (NO RACE VARIABLE): >60 ML/MIN/1.73 M^2
ESTIMATED AVG GLUCOSE: 157 MG/DL (ref 68–131)
GLUCOSE SERPL-MCNC: 141 MG/DL (ref 70–110)
HBA1C MFR BLD: 7.1 % (ref 4–5.6)
HDLC SERPL-MCNC: 41 MG/DL (ref 40–75)
HDLC SERPL: 30.4 % (ref 20–50)
LDLC SERPL CALC-MCNC: 76.6 MG/DL (ref 63–159)
NONHDLC SERPL-MCNC: 94 MG/DL
POTASSIUM SERPL-SCNC: 4.5 MMOL/L (ref 3.5–5.1)
PROT SERPL-MCNC: 6.5 G/DL (ref 6–8.4)
SODIUM SERPL-SCNC: 139 MMOL/L (ref 136–145)
TRIGL SERPL-MCNC: 87 MG/DL (ref 30–150)
TSH SERPL DL<=0.005 MIU/L-ACNC: 1.54 UIU/ML (ref 0.4–4)

## 2022-11-09 ENCOUNTER — OFFICE VISIT (OUTPATIENT)
Dept: INTERNAL MEDICINE | Facility: CLINIC | Age: 77
End: 2022-11-09
Payer: MEDICARE

## 2022-11-09 VITALS
BODY MASS INDEX: 31.57 KG/M2 | WEIGHT: 220 LBS | OXYGEN SATURATION: 93 % | DIASTOLIC BLOOD PRESSURE: 84 MMHG | HEART RATE: 92 BPM | SYSTOLIC BLOOD PRESSURE: 132 MMHG

## 2022-11-09 DIAGNOSIS — E11.9 DIABETES MELLITUS WITHOUT COMPLICATION: ICD-10-CM

## 2022-11-09 DIAGNOSIS — Z86.010 HISTORY OF COLONIC POLYPS: ICD-10-CM

## 2022-11-09 DIAGNOSIS — J43.2 CENTRILOBULAR EMPHYSEMA: ICD-10-CM

## 2022-11-09 DIAGNOSIS — E11.69 HYPERLIPIDEMIA ASSOCIATED WITH TYPE 2 DIABETES MELLITUS: Primary | ICD-10-CM

## 2022-11-09 DIAGNOSIS — E78.5 HYPERLIPIDEMIA ASSOCIATED WITH TYPE 2 DIABETES MELLITUS: Primary | ICD-10-CM

## 2022-11-09 PROCEDURE — 99999 PR PBB SHADOW E&M-EST. PATIENT-LVL IV: ICD-10-PCS | Mod: PBBFAC,,, | Performed by: INTERNAL MEDICINE

## 2022-11-09 PROCEDURE — 99214 OFFICE O/P EST MOD 30 MIN: CPT | Mod: PBBFAC,PO | Performed by: INTERNAL MEDICINE

## 2022-11-09 PROCEDURE — 99999 PR PBB SHADOW E&M-EST. PATIENT-LVL IV: CPT | Mod: PBBFAC,,, | Performed by: INTERNAL MEDICINE

## 2022-11-09 PROCEDURE — 99215 OFFICE O/P EST HI 40 MIN: CPT | Mod: S$PBB,,, | Performed by: INTERNAL MEDICINE

## 2022-11-09 PROCEDURE — 99215 PR OFFICE/OUTPT VISIT, EST, LEVL V, 40-54 MIN: ICD-10-PCS | Mod: S$PBB,,, | Performed by: INTERNAL MEDICINE

## 2022-11-09 NOTE — PROGRESS NOTES
HPI:  Patient is 77-year-old man who comes today for follow-up of his diabetes, lipids, COPD and for his annual physical.  He is doing well.  He has no reported problems or complaints.  His blood pressure home has been well controlled.  He denies any flares of his emphysema.  He denies any hypoglycemia.      Current MEDS: medcard review, verified and update  Allergies: Per the electronic medical record    Past Medical History:   Diagnosis Date    Diabetes mellitus without complication 12/14/2012    Emphysema of lung     History of colonic polyps     Hyperlipidemia associated with type 2 diabetes mellitus        History reviewed. No pertinent surgical history.    SHx: per the electronic medical record    FHx: recorded in the electronic medical record    ROS:    denies any chest pains or shortness of breath. Denies any nausea, vomiting or diarrhea. Denies any fever, chills or sweats. Denies any change in weight, voice, stool, skin or hair. Denies any dysuria, dyspepsia or dysphagia. Denies any change in vision, hearing or headaches. Denies any swollen lymph nodes or loss of memory.    PE:  /84   Pulse 92   Wt 99.8 kg (220 lb 0.3 oz)   SpO2 (!) 93%   BMI 31.57 kg/m²   Gen: Well-developed, well-nourished, male, in no acute distress, oriented x3  HEENT: neck is supple, no adenopathy, carotids 2+ equal without bruits, thyroid exam normal size without nodules.  CHEST: clear to auscultation and percussion  CVS: regular rate and rhythm without significant murmur, gallop, or rubs  ABD: soft, benign, no rebound no guarding, no distention.  Bowel sounds are normal.     nontender.  No palpable masses.  No organomegaly and no audible bruits.  RECTAL: no masses.  Prostate   Grams without nodules.  EXT: no clubbing, cyanosis, or edema  LYMPH: no cervical, inguinal, or axillary adenopathy  FEET: no loss of sensation.  No ulcers or pressure sores.  NEURO: gait normal.  Cranial nerves II- XII intact. No nystagmus.  Speech  normal.   Gross motor and sensory unremarkable.    Lab Results   Component Value Date    WBC 5.93 09/20/2021    HGB 16.3 09/20/2021    HCT 50.7 09/20/2021     (L) 09/20/2021    CHOL 135 11/02/2022    TRIG 87 11/02/2022    HDL 41 11/02/2022    ALT 28 11/02/2022    AST 19 11/02/2022     11/02/2022    K 4.5 11/02/2022     11/02/2022    CREATININE 0.9 11/02/2022    BUN 11 11/02/2022    CO2 22 (L) 11/02/2022    TSH 1.539 11/02/2022    PSA 0.59 09/20/2021    HGBA1C 7.1 (H) 11/02/2022       Impression:  Stable problems below  Patient Active Problem List   Diagnosis    Centrilobular emphysema    Diabetes mellitus without complication    History of colonic polyps    Hyperlipidemia associated with type 2 diabetes mellitus       Plan:   Orders Placed This Encounter    Hemoglobin A1C    Lipid Panel    Basic Metabolic Panel    Microalbumin/Creatinine Ratio, Urine     Medications remain same.  He will be seen again in 6 months with above lab work.  He was encouraged to get the new shingles vaccine as local pharmacy.  This note is generated with speech recognition software and is subject to transcription error and sound alike phrases that may be missed by proofreading.

## 2022-12-05 ENCOUNTER — IMMUNIZATION (OUTPATIENT)
Dept: INTERNAL MEDICINE | Facility: CLINIC | Age: 77
End: 2022-12-05
Payer: MEDICARE

## 2022-12-05 ENCOUNTER — TELEPHONE (OUTPATIENT)
Dept: INTERNAL MEDICINE | Facility: CLINIC | Age: 77
End: 2022-12-05

## 2022-12-05 PROCEDURE — G0008 ADMIN INFLUENZA VIRUS VAC: HCPCS | Mod: PBBFAC,PO

## 2022-12-05 NOTE — TELEPHONE ENCOUNTER
Called patient educated Too soon to retest. Tell him to stay off the metfomin and to stay on his diet. I suspect his sugars will be great at his next lab visit just with the weight loss alone. patient stated okay

## 2022-12-05 NOTE — TELEPHONE ENCOUNTER
Too soon to retest. Tell him to stay off the metfomin and to stay on his diet. I suspect his sugars will be great at his next lab visit just with the weight loss alone.

## 2022-12-05 NOTE — TELEPHONE ENCOUNTER
Pt came in and he started on Metformin he can not take it, he had diarrhea and will not take it again.  He wants to know can he do labs or do you want to start him on something else, he has lost a lot of weight and wants to retest.?/ please advise    He wants us to call his wife's phone [phone]

## 2023-02-26 NOTE — TELEPHONE ENCOUNTER
Care Due:                  Date            Visit Type   Department     Provider  --------------------------------------------------------------------------------                                EP -                              PRIMARY      [facility] INTERNAL  Last Visit: 11-      CARE ([facility])   SANTANA Pandey                              [facility]                              PRIMARY      [facility] INTERNAL  Next Visit: 05-      CARE ([facility])   SANTANA Pandey                                                            Last  Test          Frequency    Reason                     Performed    Due Date  --------------------------------------------------------------------------------    HBA1C.......  6 months...  metFORMIN................  11- 05-    [facility] Embedded Care Gaps. Reference number: [id number]. 2/26/2023   8:52:47 AM CST

## 2023-02-27 RX ORDER — ALPRAZOLAM 0.25 MG/1
TABLET ORAL
Qty: 60 TABLET | Refills: 5 | Status: SHIPPED | OUTPATIENT
Start: 2023-02-27 | End: 2023-09-21

## 2023-02-27 NOTE — TELEPHONE ENCOUNTER
----- Message from Gael Floyd sent at 2/27/2023 11:06 AM CST -----  Contact: david (Wife)  Patient is requesting a refill request, Please call her [phone]    ALPRAZolam (XANAX) 0.25 MG tablet 60 tablet 5 8/2/2022   Sig: TAKE ONE TABLET BY MOUTH THREE TIMES DAILY AS NEEDED      [id number] [place] [facility] [address] [address]  [address] [address] [address]  Phone: [phone] Fax: [phone]

## 2023-02-27 NOTE — TELEPHONE ENCOUNTER
Requested Prescriptions     Pending Prescriptions Disp Refills    ALPRAZolam (XANAX) 0.25 MG tablet [Pharmacy Med Name: ALPRAZOLAM 0.25 MG       TAB] 60 tablet      Sig: TAKE ONE TABLET BY MOUTH THREE TIMES DAILY AS NEEDED     Last Visit: 11-        Next Visit: 05-   08/02/2022

## 2023-05-09 ENCOUNTER — LAB VISIT (OUTPATIENT)
Dept: LAB | Facility: HOSPITAL | Age: 78
End: 2023-05-09
Attending: INTERNAL MEDICINE
Payer: MEDICARE

## 2023-05-09 DIAGNOSIS — E11.9 DIABETES MELLITUS WITHOUT COMPLICATION: ICD-10-CM

## 2023-05-09 LAB
ANION GAP SERPL CALC-SCNC: 8 MMOL/L (ref 8–16)
BUN SERPL-MCNC: 15 MG/DL (ref 8–23)
CALCIUM SERPL-MCNC: 9.3 MG/DL (ref 8.7–10.5)
CHLORIDE SERPL-SCNC: 107 MMOL/L (ref 95–110)
CHOLEST SERPL-MCNC: 145 MG/DL (ref 120–199)
CHOLEST/HDLC SERPL: 3.7 {RATIO} (ref 2–5)
CO2 SERPL-SCNC: 27 MMOL/L (ref 23–29)
CREAT SERPL-MCNC: 0.9 MG/DL (ref 0.5–1.4)
EST. GFR  (NO RACE VARIABLE): >60 ML/MIN/1.73 M^2
ESTIMATED AVG GLUCOSE: 171 MG/DL (ref 68–131)
GLUCOSE SERPL-MCNC: 163 MG/DL (ref 70–110)
HBA1C MFR BLD: 7.6 % (ref 4–5.6)
HDLC SERPL-MCNC: 39 MG/DL (ref 40–75)
HDLC SERPL: 26.9 % (ref 20–50)
LDLC SERPL CALC-MCNC: 89 MG/DL (ref 63–159)
NONHDLC SERPL-MCNC: 106 MG/DL
POTASSIUM SERPL-SCNC: 3.9 MMOL/L (ref 3.5–5.1)
SODIUM SERPL-SCNC: 142 MMOL/L (ref 136–145)
TRIGL SERPL-MCNC: 85 MG/DL (ref 30–150)

## 2023-05-09 PROCEDURE — 80048 BASIC METABOLIC PNL TOTAL CA: CPT | Performed by: INTERNAL MEDICINE

## 2023-05-09 PROCEDURE — 83036 HEMOGLOBIN GLYCOSYLATED A1C: CPT | Performed by: INTERNAL MEDICINE

## 2023-05-09 PROCEDURE — 80061 LIPID PANEL: CPT | Performed by: INTERNAL MEDICINE

## 2023-05-09 PROCEDURE — 36415 COLL VENOUS BLD VENIPUNCTURE: CPT | Mod: PO | Performed by: INTERNAL MEDICINE

## 2023-05-31 ENCOUNTER — OFFICE VISIT (OUTPATIENT)
Dept: INTERNAL MEDICINE | Facility: CLINIC | Age: 78
End: 2023-05-31
Payer: MEDICARE

## 2023-05-31 VITALS
DIASTOLIC BLOOD PRESSURE: 84 MMHG | OXYGEN SATURATION: 96 % | HEIGHT: 70 IN | HEART RATE: 89 BPM | WEIGHT: 214.06 LBS | BODY MASS INDEX: 30.65 KG/M2 | SYSTOLIC BLOOD PRESSURE: 134 MMHG

## 2023-05-31 DIAGNOSIS — J43.2 CENTRILOBULAR EMPHYSEMA: ICD-10-CM

## 2023-05-31 DIAGNOSIS — E11.69 HYPERLIPIDEMIA ASSOCIATED WITH TYPE 2 DIABETES MELLITUS: Primary | ICD-10-CM

## 2023-05-31 DIAGNOSIS — D69.6 THROMBOCYTOPENIA, UNSPECIFIED: ICD-10-CM

## 2023-05-31 DIAGNOSIS — Z86.010 HISTORY OF COLONIC POLYPS: ICD-10-CM

## 2023-05-31 DIAGNOSIS — E11.9 DIABETES MELLITUS WITHOUT COMPLICATION: ICD-10-CM

## 2023-05-31 DIAGNOSIS — E78.5 HYPERLIPIDEMIA ASSOCIATED WITH TYPE 2 DIABETES MELLITUS: Primary | ICD-10-CM

## 2023-05-31 PROCEDURE — 99214 OFFICE O/P EST MOD 30 MIN: CPT | Mod: PBBFAC,PO | Performed by: INTERNAL MEDICINE

## 2023-05-31 PROCEDURE — 99999 PR PBB SHADOW E&M-EST. PATIENT-LVL IV: CPT | Mod: PBBFAC,,, | Performed by: INTERNAL MEDICINE

## 2023-05-31 PROCEDURE — 99214 PR OFFICE/OUTPT VISIT, EST, LEVL IV, 30-39 MIN: ICD-10-PCS | Mod: 25,S$PBB,, | Performed by: INTERNAL MEDICINE

## 2023-05-31 PROCEDURE — 90677 PCV20 VACCINE IM: CPT | Mod: PBBFAC,PO

## 2023-05-31 PROCEDURE — 99999 PR PBB SHADOW E&M-EST. PATIENT-LVL IV: ICD-10-PCS | Mod: PBBFAC,,, | Performed by: INTERNAL MEDICINE

## 2023-05-31 PROCEDURE — 99214 OFFICE O/P EST MOD 30 MIN: CPT | Mod: 25,S$PBB,, | Performed by: INTERNAL MEDICINE

## 2023-05-31 RX ORDER — METFORMIN HYDROCHLORIDE 500 MG/1
500 TABLET ORAL 2 TIMES DAILY WITH MEALS
Qty: 180 TABLET | Refills: 3 | Status: SHIPPED | OUTPATIENT
Start: 2023-05-31 | End: 2023-05-31

## 2023-05-31 RX ORDER — ATORVASTATIN CALCIUM 40 MG/1
40 TABLET, FILM COATED ORAL NIGHTLY
Qty: 90 TABLET | Refills: 3 | Status: SHIPPED | OUTPATIENT
Start: 2023-05-31

## 2023-05-31 NOTE — PROGRESS NOTES
Administered Prevnar 20 vaccine into   left deltoid.  Patient tolerated well, no adverse reaction noted. Advised 15 min wait

## 2023-05-31 NOTE — PROGRESS NOTES
"HPI:  Patient is a 77-year-old man who comes today for follow-up of diabetes, lipids and COPD.  Patient has not been taking the metformin.  He currently is not taking anything for his diabetes.  He states his asthma/COPD he has been doing well.  He is had no flares.  He denies any other new problems or complaints    Current meds have been verified and updated per the EMR  Exam:/84   Pulse 89   Ht 5' 10" (1.778 m)   Wt 97.1 kg (214 lb 1.1 oz)   SpO2 96%   BMI 30.72 kg/m²   Carotids 2+ equal without bruits  Chest clear  Cardiovascular regular rate and rhythm without murmur gallop or rub    Lab Results   Component Value Date    WBC 5.93 09/20/2021    HGB 16.3 09/20/2021    HCT 50.7 09/20/2021     (L) 09/20/2021    CHOL 145 05/09/2023    TRIG 85 05/09/2023    HDL 39 (L) 05/09/2023    ALT 28 11/02/2022    AST 19 11/02/2022     05/09/2023    K 3.9 05/09/2023     05/09/2023    CREATININE 0.9 05/09/2023    BUN 15 05/09/2023    CO2 27 05/09/2023    TSH 1.539 11/02/2022    PSA 0.59 09/20/2021    HGBA1C 7.6 (H) 05/09/2023       Impression:  Diabetes, not to goal  Lipids, stable  COPD, stable  Patient Active Problem List   Diagnosis    Centrilobular emphysema    Diabetes mellitus without complication    History of colonic polyps    Hyperlipidemia associated with type 2 diabetes mellitus       Plan:  Orders Placed This Encounter    (In Office Administered) Pneumococcal Conjugate Vaccine (20 Valent) (IM)    Comprehensive Metabolic Panel    Hemoglobin A1C    Lipid Panel    TSH    CBC Auto Differential    Microalbumin/Creatinine Ratio, Urine    atorvastatin (LIPITOR) 40 MG tablet    empagliflozin (JARDIANCE) 10 mg tablet     Patient will be started on Jardiance.  He has intolerance problems with metformin.  He will continue with his other meds.  He was given Prevnar 20 vaccine today.  He will be seen again in 6 months with the above lab work.    This note is generated with speech recognition software " and is subject to transcription error and sound alike phrases that may be missed by proofreading.

## 2023-08-03 ENCOUNTER — TELEPHONE (OUTPATIENT)
Dept: INTERNAL MEDICINE | Facility: CLINIC | Age: 78
End: 2023-08-03
Payer: MEDICARE

## 2023-08-03 NOTE — TELEPHONE ENCOUNTER
Spoke with pt's wife, she stated pt is having a lot of diarrhea. Let her know he would probably need to be seen, wife stated she would try OTC immodium and call tomorrow if still needed. Wife verbalized understating.

## 2023-08-03 NOTE — TELEPHONE ENCOUNTER
----- Message from Oralia Culp sent at 8/3/2023  4:25 PM CDT -----  Patients wife is requesting a call back before close concerning a prescription. Call back at [phone] thx jm

## 2023-11-06 RX ORDER — ALPRAZOLAM 0.25 MG/1
TABLET ORAL
Qty: 60 TABLET | Refills: 5 | Status: SHIPPED | OUTPATIENT
Start: 2023-11-06

## 2023-11-06 NOTE — TELEPHONE ENCOUNTER
No care due was identified.  [facility] Embedded Care Due Messages. Reference number: [id number].   11/06/2023 3:09:38 PM CST

## 2023-12-01 ENCOUNTER — LAB VISIT (OUTPATIENT)
Dept: LAB | Facility: HOSPITAL | Age: 78
End: 2023-12-01
Attending: INTERNAL MEDICINE
Payer: MEDICARE

## 2023-12-01 DIAGNOSIS — E11.9 DIABETES MELLITUS WITHOUT COMPLICATION: ICD-10-CM

## 2023-12-01 LAB
ALBUMIN SERPL BCP-MCNC: 3.8 G/DL (ref 3.5–5.2)
ALP SERPL-CCNC: 86 U/L (ref 55–135)
ALT SERPL W/O P-5'-P-CCNC: 23 U/L (ref 10–44)
ANION GAP SERPL CALC-SCNC: 9 MMOL/L (ref 8–16)
AST SERPL-CCNC: 18 U/L (ref 10–40)
BASOPHILS # BLD AUTO: 0.03 K/UL (ref 0–0.2)
BASOPHILS NFR BLD: 0.5 % (ref 0–1.9)
BILIRUB SERPL-MCNC: 1 MG/DL (ref 0.1–1)
BUN SERPL-MCNC: 16 MG/DL (ref 8–23)
CALCIUM SERPL-MCNC: 9.6 MG/DL (ref 8.7–10.5)
CHLORIDE SERPL-SCNC: 106 MMOL/L (ref 95–110)
CHOLEST SERPL-MCNC: 134 MG/DL (ref 120–199)
CHOLEST/HDLC SERPL: 3.6 {RATIO} (ref 2–5)
CO2 SERPL-SCNC: 27 MMOL/L (ref 23–29)
CREAT SERPL-MCNC: 0.9 MG/DL (ref 0.5–1.4)
DIFFERENTIAL METHOD: ABNORMAL
EOSINOPHIL # BLD AUTO: 0.1 K/UL (ref 0–0.5)
EOSINOPHIL NFR BLD: 1.9 % (ref 0–8)
ERYTHROCYTE [DISTWIDTH] IN BLOOD BY AUTOMATED COUNT: 14 % (ref 11.5–14.5)
EST. GFR  (NO RACE VARIABLE): >60 ML/MIN/1.73 M^2
ESTIMATED AVG GLUCOSE: 151 MG/DL (ref 68–131)
GLUCOSE SERPL-MCNC: 126 MG/DL (ref 70–110)
HBA1C MFR BLD: 6.9 % (ref 4–5.6)
HCT VFR BLD AUTO: 48.5 % (ref 40–54)
HDLC SERPL-MCNC: 37 MG/DL (ref 40–75)
HDLC SERPL: 27.6 % (ref 20–50)
HGB BLD-MCNC: 15.7 G/DL (ref 14–18)
IMM GRANULOCYTES # BLD AUTO: 0.04 K/UL (ref 0–0.04)
IMM GRANULOCYTES NFR BLD AUTO: 0.6 % (ref 0–0.5)
LDLC SERPL CALC-MCNC: 83 MG/DL (ref 63–159)
LYMPHOCYTES # BLD AUTO: 1.2 K/UL (ref 1–4.8)
LYMPHOCYTES NFR BLD: 18.9 % (ref 18–48)
MCH RBC QN AUTO: 28.9 PG (ref 27–31)
MCHC RBC AUTO-ENTMCNC: 32.4 G/DL (ref 32–36)
MCV RBC AUTO: 89 FL (ref 82–98)
MONOCYTES # BLD AUTO: 0.6 K/UL (ref 0.3–1)
MONOCYTES NFR BLD: 9.2 % (ref 4–15)
NEUTROPHILS # BLD AUTO: 4.4 K/UL (ref 1.8–7.7)
NEUTROPHILS NFR BLD: 68.9 % (ref 38–73)
NONHDLC SERPL-MCNC: 97 MG/DL
NRBC BLD-RTO: 0 /100 WBC
PLATELET # BLD AUTO: 143 K/UL (ref 150–450)
PMV BLD AUTO: 11.3 FL (ref 9.2–12.9)
POTASSIUM SERPL-SCNC: 4.2 MMOL/L (ref 3.5–5.1)
PROT SERPL-MCNC: 6.4 G/DL (ref 6–8.4)
RBC # BLD AUTO: 5.43 M/UL (ref 4.6–6.2)
SODIUM SERPL-SCNC: 142 MMOL/L (ref 136–145)
TRIGL SERPL-MCNC: 70 MG/DL (ref 30–150)
TSH SERPL DL<=0.005 MIU/L-ACNC: 1.27 UIU/ML (ref 0.4–4)
WBC # BLD AUTO: 6.44 K/UL (ref 3.9–12.7)

## 2023-12-01 PROCEDURE — 83036 HEMOGLOBIN GLYCOSYLATED A1C: CPT | Performed by: INTERNAL MEDICINE

## 2023-12-01 PROCEDURE — 36415 COLL VENOUS BLD VENIPUNCTURE: CPT | Mod: PO | Performed by: INTERNAL MEDICINE

## 2023-12-01 PROCEDURE — 80053 COMPREHEN METABOLIC PANEL: CPT | Performed by: INTERNAL MEDICINE

## 2023-12-01 PROCEDURE — 80061 LIPID PANEL: CPT | Performed by: INTERNAL MEDICINE

## 2023-12-01 PROCEDURE — 84443 ASSAY THYROID STIM HORMONE: CPT | Performed by: INTERNAL MEDICINE

## 2023-12-01 PROCEDURE — 85025 COMPLETE CBC W/AUTO DIFF WBC: CPT | Performed by: INTERNAL MEDICINE

## 2023-12-06 ENCOUNTER — OFFICE VISIT (OUTPATIENT)
Dept: INTERNAL MEDICINE | Facility: CLINIC | Age: 78
End: 2023-12-06
Payer: MEDICARE

## 2023-12-06 VITALS
BODY MASS INDEX: 28.85 KG/M2 | SYSTOLIC BLOOD PRESSURE: 136 MMHG | DIASTOLIC BLOOD PRESSURE: 86 MMHG | OXYGEN SATURATION: 97 % | WEIGHT: 201.5 LBS | HEIGHT: 70 IN | HEART RATE: 84 BPM

## 2023-12-06 DIAGNOSIS — Z23 NEED FOR VACCINATION: ICD-10-CM

## 2023-12-06 DIAGNOSIS — E11.9 DIABETES MELLITUS WITHOUT COMPLICATION: ICD-10-CM

## 2023-12-06 DIAGNOSIS — D69.6 THROMBOCYTOPENIA, UNSPECIFIED: ICD-10-CM

## 2023-12-06 DIAGNOSIS — E11.69 HYPERLIPIDEMIA ASSOCIATED WITH TYPE 2 DIABETES MELLITUS: ICD-10-CM

## 2023-12-06 DIAGNOSIS — J43.2 CENTRILOBULAR EMPHYSEMA: ICD-10-CM

## 2023-12-06 DIAGNOSIS — Z00.00 ROUTINE GENERAL MEDICAL EXAMINATION AT A HEALTH CARE FACILITY: Primary | ICD-10-CM

## 2023-12-06 DIAGNOSIS — Z86.010 HISTORY OF COLONIC POLYPS: ICD-10-CM

## 2023-12-06 DIAGNOSIS — E78.5 HYPERLIPIDEMIA ASSOCIATED WITH TYPE 2 DIABETES MELLITUS: ICD-10-CM

## 2023-12-06 PROCEDURE — G0008 ADMIN INFLUENZA VIRUS VAC: HCPCS | Mod: PBBFAC,PO

## 2023-12-06 PROCEDURE — 99999PBSHW FLU VACCINE - QUADRIVALENT - ADJUVANTED: ICD-10-PCS | Mod: PBBFAC,,,

## 2023-12-06 PROCEDURE — 99215 OFFICE O/P EST HI 40 MIN: CPT | Mod: S$PBB,,, | Performed by: INTERNAL MEDICINE

## 2023-12-06 PROCEDURE — 99215 PR OFFICE/OUTPT VISIT, EST, LEVL V, 40-54 MIN: ICD-10-PCS | Mod: S$PBB,,, | Performed by: INTERNAL MEDICINE

## 2023-12-06 PROCEDURE — 99999 PR PBB SHADOW E&M-EST. PATIENT-LVL IV: CPT | Mod: PBBFAC,,, | Performed by: INTERNAL MEDICINE

## 2023-12-06 PROCEDURE — 99214 OFFICE O/P EST MOD 30 MIN: CPT | Mod: PBBFAC,PO | Performed by: INTERNAL MEDICINE

## 2023-12-06 PROCEDURE — 99999PBSHW FLU VACCINE - QUADRIVALENT - ADJUVANTED: Mod: PBBFAC,,,

## 2023-12-06 PROCEDURE — 99999 PR PBB SHADOW E&M-EST. PATIENT-LVL IV: ICD-10-PCS | Mod: PBBFAC,,, | Performed by: INTERNAL MEDICINE

## 2023-12-06 NOTE — PROGRESS NOTES
"HPI:  Patient is a 78-year-old man comes today for follow-up of his diabetes, lipids, emphysema, and for his annual physical exam.  Patient continues do very well.  He denies any hypoglycemic problems.  His blood sugar at home has been well controlled.  He denies any problems with his emphysema.  He is had no exacerbations.      Current MEDS: medcard review, verified and update  Allergies: Per the electronic medical record    Past Medical History:   Diagnosis Date    Centrilobular emphysema     Diabetes mellitus without complication 12/14/2012    History of colonic polyps     Hyperlipidemia associated with type 2 diabetes mellitus     Thrombocytopenia, unspecified     Mild       History reviewed. No pertinent surgical history.    SHx: per the electronic medical record    FHx: recorded in the electronic medical record    ROS:    denies any chest pains or shortness of breath. Denies any nausea, vomiting or diarrhea. Denies any fever, chills or sweats. Denies any change in weight, voice, stool, skin or hair. Denies any dysuria, dyspepsia or dysphagia. Denies any change in vision, hearing or headaches. Denies any swollen lymph nodes or loss of memory.    PE:  /86 (BP Location: Right arm)   Pulse 84   Ht 5' 10" (1.778 m)   Wt 91.4 kg (201 lb 8 oz)   SpO2 97%   BMI 28.91 kg/m²   Gen: Well-developed, well-nourished, male, in no acute distress, oriented x3  HEENT: neck is supple, no adenopathy, carotids 2+ equal without bruits, thyroid exam normal size without nodules.  CHEST: clear to auscultation and percussion  CVS: regular rate and rhythm without significant murmur, gallop, or rubs  ABD: soft, benign, no rebound no guarding, no distention.  Bowel sounds are normal.     nontender.  No palpable masses.  No organomegaly and no audible bruits.  RECTAL:  Deferred.  EXT: no clubbing, cyanosis, or edema  LYMPH: no cervical, inguinal, or axillary adenopathy  FEET: no loss of sensation.  No ulcers or pressure sores.  " Monofilament testing normal  NEURO: gait normal.  Cranial nerves II- XII intact. No nystagmus.  Speech normal.   Gross motor and sensory unremarkable.    Lab Results   Component Value Date    WBC 6.44 12/01/2023    HGB 15.7 12/01/2023    HCT 48.5 12/01/2023     (L) 12/01/2023    CHOL 134 12/01/2023    TRIG 70 12/01/2023    HDL 37 (L) 12/01/2023    ALT 23 12/01/2023    AST 18 12/01/2023     12/01/2023    K 4.2 12/01/2023     12/01/2023    CREATININE 0.9 12/01/2023    BUN 16 12/01/2023    CO2 27 12/01/2023    TSH 1.269 12/01/2023    PSA 0.59 09/20/2021    HGBA1C 6.9 (H) 12/01/2023       Impression:  Diabetes and lipids both very well controlled on current therapy  COPD, well controlled on current therapy, no exacerbation  Patient Active Problem List   Diagnosis    Centrilobular emphysema    Diabetes mellitus without complication    History of colonic polyps    Hyperlipidemia associated with type 2 diabetes mellitus    Thrombocytopenia, unspecified       Plan:   Orders Placed This Encounter    Influenza - Quadrivalent (Adjuvanted)    Hemoglobin A1C    Comprehensive Metabolic Panel    Lipid Panel    TSH    CBC Auto Differential    Microalbumin/Creatinine Ratio, Urine   Patient was given high-dose influenza vaccine today.  He will be seen again in 6 months with above lab work.  Medications remain the same    This note is generated with speech recognition software and is subject to transcription error and sound alike phrases that may be missed by proofreading.

## 2024-05-14 RX ORDER — ALPRAZOLAM 0.25 MG/1
TABLET ORAL
Qty: 60 TABLET | Refills: 5 | Status: SHIPPED | OUTPATIENT
Start: 2024-05-14

## 2024-05-14 NOTE — TELEPHONE ENCOUNTER
Requested Prescriptions     Pending Prescriptions Disp Refills    ALPRAZolam (XANAX) 0.25 MG tablet [Pharmacy Med Name: alprazolam 0.25 mg tablet] 60 tablet 5     Sig: TAKE ONE TABLET BY MOUTH THREE TIMES DAILY AS NEEDED     LV 12/06/2023   NV 06/11/2024   LF 11/06/2023

## 2024-06-06 ENCOUNTER — LAB VISIT (OUTPATIENT)
Dept: LAB | Facility: HOSPITAL | Age: 79
End: 2024-06-06
Attending: INTERNAL MEDICINE
Payer: MEDICARE

## 2024-06-06 DIAGNOSIS — E11.9 DIABETES MELLITUS WITHOUT COMPLICATION: ICD-10-CM

## 2024-06-06 LAB
ALBUMIN SERPL BCP-MCNC: 3.9 G/DL (ref 3.5–5.2)
ALP SERPL-CCNC: 80 U/L (ref 55–135)
ALT SERPL W/O P-5'-P-CCNC: 38 U/L (ref 10–44)
ANION GAP SERPL CALC-SCNC: 8 MMOL/L (ref 8–16)
AST SERPL-CCNC: 21 U/L (ref 10–40)
BASOPHILS # BLD AUTO: 0.04 K/UL (ref 0–0.2)
BASOPHILS NFR BLD: 0.6 % (ref 0–1.9)
BILIRUB SERPL-MCNC: 1 MG/DL (ref 0.1–1)
BUN SERPL-MCNC: 13 MG/DL (ref 8–23)
CALCIUM SERPL-MCNC: 9.3 MG/DL (ref 8.7–10.5)
CHLORIDE SERPL-SCNC: 106 MMOL/L (ref 95–110)
CHOLEST SERPL-MCNC: 197 MG/DL (ref 120–199)
CHOLEST/HDLC SERPL: 5.5 {RATIO} (ref 2–5)
CO2 SERPL-SCNC: 26 MMOL/L (ref 23–29)
CREAT SERPL-MCNC: 1 MG/DL (ref 0.5–1.4)
DIFFERENTIAL METHOD BLD: ABNORMAL
EOSINOPHIL # BLD AUTO: 0.1 K/UL (ref 0–0.5)
EOSINOPHIL NFR BLD: 2.1 % (ref 0–8)
ERYTHROCYTE [DISTWIDTH] IN BLOOD BY AUTOMATED COUNT: 13.7 % (ref 11.5–14.5)
EST. GFR  (NO RACE VARIABLE): >60 ML/MIN/1.73 M^2
ESTIMATED AVG GLUCOSE: 146 MG/DL (ref 68–131)
GLUCOSE SERPL-MCNC: 131 MG/DL (ref 70–110)
HBA1C MFR BLD: 6.7 % (ref 4–5.6)
HCT VFR BLD AUTO: 49 % (ref 40–54)
HDLC SERPL-MCNC: 36 MG/DL (ref 40–75)
HDLC SERPL: 18.3 % (ref 20–50)
HGB BLD-MCNC: 16.1 G/DL (ref 14–18)
IMM GRANULOCYTES # BLD AUTO: 0.03 K/UL (ref 0–0.04)
IMM GRANULOCYTES NFR BLD AUTO: 0.5 % (ref 0–0.5)
LDLC SERPL CALC-MCNC: 141.6 MG/DL (ref 63–159)
LYMPHOCYTES # BLD AUTO: 1.2 K/UL (ref 1–4.8)
LYMPHOCYTES NFR BLD: 18.6 % (ref 18–48)
MCH RBC QN AUTO: 28.8 PG (ref 27–31)
MCHC RBC AUTO-ENTMCNC: 32.9 G/DL (ref 32–36)
MCV RBC AUTO: 88 FL (ref 82–98)
MONOCYTES # BLD AUTO: 0.5 K/UL (ref 0.3–1)
MONOCYTES NFR BLD: 8 % (ref 4–15)
NEUTROPHILS # BLD AUTO: 4.4 K/UL (ref 1.8–7.7)
NEUTROPHILS NFR BLD: 70.2 % (ref 38–73)
NONHDLC SERPL-MCNC: 161 MG/DL
NRBC BLD-RTO: 0 /100 WBC
PLATELET # BLD AUTO: 139 K/UL (ref 150–450)
PMV BLD AUTO: 11.6 FL (ref 9.2–12.9)
POTASSIUM SERPL-SCNC: 4.4 MMOL/L (ref 3.5–5.1)
PROT SERPL-MCNC: 6.3 G/DL (ref 6–8.4)
RBC # BLD AUTO: 5.59 M/UL (ref 4.6–6.2)
SODIUM SERPL-SCNC: 140 MMOL/L (ref 136–145)
TRIGL SERPL-MCNC: 97 MG/DL (ref 30–150)
TSH SERPL DL<=0.005 MIU/L-ACNC: 1.29 UIU/ML (ref 0.4–4)
WBC # BLD AUTO: 6.23 K/UL (ref 3.9–12.7)

## 2024-06-06 PROCEDURE — 80061 LIPID PANEL: CPT | Performed by: INTERNAL MEDICINE

## 2024-06-06 PROCEDURE — 84443 ASSAY THYROID STIM HORMONE: CPT | Performed by: INTERNAL MEDICINE

## 2024-06-06 PROCEDURE — 85025 COMPLETE CBC W/AUTO DIFF WBC: CPT | Performed by: INTERNAL MEDICINE

## 2024-06-06 PROCEDURE — 83036 HEMOGLOBIN GLYCOSYLATED A1C: CPT | Performed by: INTERNAL MEDICINE

## 2024-06-06 PROCEDURE — 36415 COLL VENOUS BLD VENIPUNCTURE: CPT | Mod: PO | Performed by: INTERNAL MEDICINE

## 2024-06-06 PROCEDURE — 80053 COMPREHEN METABOLIC PANEL: CPT | Performed by: INTERNAL MEDICINE

## 2024-06-11 ENCOUNTER — OFFICE VISIT (OUTPATIENT)
Dept: INTERNAL MEDICINE | Facility: CLINIC | Age: 79
End: 2024-06-11
Payer: MEDICARE

## 2024-06-11 VITALS
HEIGHT: 70 IN | HEART RATE: 103 BPM | BODY MASS INDEX: 29.38 KG/M2 | WEIGHT: 205.25 LBS | SYSTOLIC BLOOD PRESSURE: 138 MMHG | DIASTOLIC BLOOD PRESSURE: 80 MMHG | OXYGEN SATURATION: 95 %

## 2024-06-11 DIAGNOSIS — E11.9 DIABETES MELLITUS WITHOUT COMPLICATION: Primary | ICD-10-CM

## 2024-06-11 DIAGNOSIS — Z86.010 HISTORY OF COLONIC POLYPS: ICD-10-CM

## 2024-06-11 DIAGNOSIS — E11.69 HYPERLIPIDEMIA ASSOCIATED WITH TYPE 2 DIABETES MELLITUS: ICD-10-CM

## 2024-06-11 DIAGNOSIS — E78.5 HYPERLIPIDEMIA ASSOCIATED WITH TYPE 2 DIABETES MELLITUS: ICD-10-CM

## 2024-06-11 DIAGNOSIS — J43.2 CENTRILOBULAR EMPHYSEMA: ICD-10-CM

## 2024-06-11 DIAGNOSIS — D69.6 THROMBOCYTOPENIA, UNSPECIFIED: ICD-10-CM

## 2024-06-11 PROCEDURE — G2211 COMPLEX E/M VISIT ADD ON: HCPCS | Mod: S$PBB,,, | Performed by: INTERNAL MEDICINE

## 2024-06-11 PROCEDURE — 99213 OFFICE O/P EST LOW 20 MIN: CPT | Mod: PBBFAC,PO | Performed by: INTERNAL MEDICINE

## 2024-06-11 PROCEDURE — 99999 PR PBB SHADOW E&M-EST. PATIENT-LVL III: CPT | Mod: PBBFAC,,, | Performed by: INTERNAL MEDICINE

## 2024-06-11 PROCEDURE — 99214 OFFICE O/P EST MOD 30 MIN: CPT | Mod: S$PBB,,, | Performed by: INTERNAL MEDICINE

## 2024-06-11 RX ORDER — ATORVASTATIN CALCIUM 40 MG/1
40 TABLET, FILM COATED ORAL NIGHTLY
Qty: 90 TABLET | Refills: 3 | Status: SHIPPED | OUTPATIENT
Start: 2024-06-11

## 2024-06-11 NOTE — PROGRESS NOTES
"HPI:    Patient is 78-year-old man who comes in today for follow-up of his diabetes, hypertension and lipids.  Patient has been doing very well.  He has no reported problems or complaints.  He denies any hypoglycemic problems.  His blood pressure has been well controlled at home.    Current meds have been verified and updated per the EMR  Exam:/80 (BP Location: Right arm)   Pulse 103   Ht 5' 10" (1.778 m)   Wt 93.1 kg (205 lb 4 oz)   SpO2 95%   BMI 29.45 kg/m²     Carotids 2+ equal without bruits, neck is supple without adenopathy   Chest clear   Cardiovascular regular rate and rhythm with a 1-2/6 systolic murmur   Extremities without edema    Lab Results   Component Value Date    WBC 6.23 06/06/2024    HGB 16.1 06/06/2024    HCT 49.0 06/06/2024     (L) 06/06/2024    CHOL 197 06/06/2024    TRIG 97 06/06/2024    HDL 36 (L) 06/06/2024    ALT 38 06/06/2024    AST 21 06/06/2024     06/06/2024    K 4.4 06/06/2024     06/06/2024    CREATININE 1.0 06/06/2024    BUN 13 06/06/2024    CO2 26 06/06/2024    TSH 1.288 06/06/2024    PSA 0.59 09/20/2021    HGBA1C 6.7 (H) 06/06/2024       Impression:   Diabetes, excellent control   Hyperlipidemia, not controlled.  Patient has not been taking the atorvastatin  Patient Active Problem List   Diagnosis    Centrilobular emphysema    Diabetes mellitus without complication    History of colonic polyps    Hyperlipidemia associated with type 2 diabetes mellitus    Thrombocytopenia, unspecified       Plan:  Orders Placed This Encounter    Hemoglobin A1C    Comprehensive Metabolic Panel    Lipid Panel    TSH    atorvastatin (LIPITOR) 40 MG tablet      Patient will restart the atorvastatin.  He will be seen again in 6 months with above lab work.    This note is generated with speech recognition software and is subject to transcription error and sound alike phrases that may be missed by proofreading.      "

## 2024-08-15 ENCOUNTER — OFFICE VISIT (OUTPATIENT)
Dept: INTERNAL MEDICINE | Facility: CLINIC | Age: 79
End: 2024-08-15
Payer: MEDICARE

## 2024-08-15 ENCOUNTER — HOSPITAL ENCOUNTER (OUTPATIENT)
Dept: RADIOLOGY | Facility: HOSPITAL | Age: 79
Discharge: HOME | End: 2024-08-15
Attending: INTERNAL MEDICINE
Payer: MEDICARE

## 2024-08-15 ENCOUNTER — TELEPHONE (OUTPATIENT)
Dept: INTERNAL MEDICINE | Facility: CLINIC | Age: 79
End: 2024-08-15

## 2024-08-15 VITALS
DIASTOLIC BLOOD PRESSURE: 86 MMHG | WEIGHT: 198.19 LBS | BODY MASS INDEX: 28.44 KG/M2 | SYSTOLIC BLOOD PRESSURE: 120 MMHG | TEMPERATURE: 98 F | HEART RATE: 85 BPM | OXYGEN SATURATION: 93 %

## 2024-08-15 DIAGNOSIS — J69.0 ASPIRATION PNEUMONIA OF RIGHT LOWER LOBE, UNSPECIFIED ASPIRATION PNEUMONIA TYPE: ICD-10-CM

## 2024-08-15 DIAGNOSIS — N13.8 BPH WITH OBSTRUCTION/LOWER URINARY TRACT SYMPTOMS: Primary | ICD-10-CM

## 2024-08-15 DIAGNOSIS — N40.1 BPH WITH OBSTRUCTION/LOWER URINARY TRACT SYMPTOMS: Primary | ICD-10-CM

## 2024-08-15 PROCEDURE — 99214 OFFICE O/P EST MOD 30 MIN: CPT | Mod: PBBFAC,PO | Performed by: INTERNAL MEDICINE

## 2024-08-15 PROCEDURE — 71046 X-RAY EXAM CHEST 2 VIEWS: CPT | Mod: 26,,, | Performed by: RADIOLOGY

## 2024-08-15 PROCEDURE — 99213 OFFICE O/P EST LOW 20 MIN: CPT | Mod: S$PBB,,, | Performed by: INTERNAL MEDICINE

## 2024-08-15 PROCEDURE — 99999 PR PBB SHADOW E&M-EST. PATIENT-LVL IV: CPT | Mod: PBBFAC,,, | Performed by: INTERNAL MEDICINE

## 2024-08-15 PROCEDURE — 71046 X-RAY EXAM CHEST 2 VIEWS: CPT | Mod: TC,PO

## 2024-08-15 RX ORDER — FINASTERIDE 5 MG/1
5 TABLET, FILM COATED ORAL DAILY
Qty: 90 TABLET | Refills: 3 | Status: SHIPPED | OUTPATIENT
Start: 2024-08-15 | End: 2025-08-15

## 2024-08-15 RX ORDER — TAMSULOSIN HYDROCHLORIDE 0.4 MG/1
0.4 CAPSULE ORAL DAILY
Qty: 90 CAPSULE | Refills: 3 | Status: SHIPPED | OUTPATIENT
Start: 2024-08-15 | End: 2025-08-15

## 2024-08-15 NOTE — PROGRESS NOTES
HPI:  Patient is a 79-year-old man who comes in today with complaints of frequent urination and having to get up to go the bathroom multiple times every night.  He was given Flomax at a local urgent care clinic and had good results with it.  He has ran out of the Flomax.  Patient was admitted to the hospital about 4 weeks ago for right lower lobe pneumonia.  I have reviewed those records.  He has finished all those antibiotics.  He denies any cough at this time.    Current meds have been verified and updated per the EMR  Exam:/86 (BP Location: Right arm, Patient Position: Sitting, BP Method: Large (Manual))   Pulse 85   Temp 97.6 °F (36.4 °C) (Tympanic)   Wt 89.9 kg (198 lb 3.1 oz)   SpO2 (!) 93%   BMI 28.44 kg/m²   Carotids 2+ equal without bruits, neck is supple without adenopathy  Chest clear  Cardiovascular regular rate and rhythm without murmur gallop or rub    Lab Results   Component Value Date    WBC 6.23 06/06/2024    HGB 16.1 06/06/2024    HCT 49.0 06/06/2024     (L) 06/06/2024    CHOL 197 06/06/2024    TRIG 97 06/06/2024    HDL 36 (L) 06/06/2024    ALT 38 06/06/2024    AST 21 06/06/2024     07/16/2024    K 3.9 07/16/2024     07/16/2024    CREATININE 1.35 (H) 07/16/2024    BUN 20 (H) 07/16/2024    CO2 26 07/16/2024    TSH 1.288 06/06/2024    PSA 0.59 09/20/2021    HGBA1C 6.7 (H) 06/06/2024       Impression:  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  Needs repeat chest x-ray for confirmation  Patient Active Problem List   Diagnosis    Centrilobular emphysema    Diabetes mellitus without complication    History of colonic polyps    Hyperlipidemia associated with type 2 diabetes mellitus    Thrombocytopenia, unspecified       Plan:  Orders Placed This Encounter    X-Ray Chest PA And Lateral    tamsulosin (FLOMAX) 0.4 mg Cap    finasteride (PROSCAR) 5 mg tablet     Patient was placed on finasteride and tamsulosin.  He  will have repeat chest x-ray done.  We will notify him with results.    This note is generated with speech recognition software and is subject to transcription error and sound alike phrases that may be missed by proofreading.

## 2024-11-25 DIAGNOSIS — F41.1 GAD (GENERALIZED ANXIETY DISORDER): Primary | ICD-10-CM

## 2024-11-25 RX ORDER — ALPRAZOLAM 0.25 MG/1
TABLET ORAL
Qty: 60 TABLET | Refills: 5 | Status: SHIPPED | OUTPATIENT
Start: 2024-11-25

## 2024-12-04 ENCOUNTER — LAB VISIT (OUTPATIENT)
Dept: LAB | Facility: HOSPITAL | Age: 79
End: 2024-12-04
Attending: INTERNAL MEDICINE
Payer: MEDICARE

## 2024-12-04 DIAGNOSIS — E11.9 DIABETES MELLITUS WITHOUT COMPLICATION: ICD-10-CM

## 2024-12-04 LAB
ALBUMIN SERPL BCP-MCNC: 3.9 G/DL (ref 3.5–5.2)
ALP SERPL-CCNC: 80 U/L (ref 40–150)
ALT SERPL W/O P-5'-P-CCNC: 29 U/L (ref 10–44)
ANION GAP SERPL CALC-SCNC: 10 MMOL/L (ref 8–16)
AST SERPL-CCNC: 19 U/L (ref 10–40)
BILIRUB SERPL-MCNC: 1 MG/DL (ref 0.1–1)
BUN SERPL-MCNC: 19 MG/DL (ref 8–23)
CALCIUM SERPL-MCNC: 9.6 MG/DL (ref 8.7–10.5)
CHLORIDE SERPL-SCNC: 108 MMOL/L (ref 95–110)
CHOLEST SERPL-MCNC: 153 MG/DL (ref 120–199)
CHOLEST/HDLC SERPL: 3.6 {RATIO} (ref 2–5)
CO2 SERPL-SCNC: 24 MMOL/L (ref 23–29)
CREAT SERPL-MCNC: 1 MG/DL (ref 0.5–1.4)
EST. GFR  (NO RACE VARIABLE): >60 ML/MIN/1.73 M^2
ESTIMATED AVG GLUCOSE: 157 MG/DL (ref 68–131)
GLUCOSE SERPL-MCNC: 128 MG/DL (ref 70–110)
HBA1C MFR BLD: 7.1 % (ref 4–5.6)
HDLC SERPL-MCNC: 42 MG/DL (ref 40–75)
HDLC SERPL: 27.5 % (ref 20–50)
LDLC SERPL CALC-MCNC: 97 MG/DL (ref 63–159)
NONHDLC SERPL-MCNC: 111 MG/DL
POTASSIUM SERPL-SCNC: 4.5 MMOL/L (ref 3.5–5.1)
PROT SERPL-MCNC: 6.9 G/DL (ref 6–8.4)
SODIUM SERPL-SCNC: 142 MMOL/L (ref 136–145)
TRIGL SERPL-MCNC: 70 MG/DL (ref 30–150)
TSH SERPL DL<=0.005 MIU/L-ACNC: 1.3 UIU/ML (ref 0.4–4)

## 2024-12-04 PROCEDURE — 84443 ASSAY THYROID STIM HORMONE: CPT | Performed by: INTERNAL MEDICINE

## 2024-12-04 PROCEDURE — 83036 HEMOGLOBIN GLYCOSYLATED A1C: CPT | Performed by: INTERNAL MEDICINE

## 2024-12-04 PROCEDURE — 80061 LIPID PANEL: CPT | Performed by: INTERNAL MEDICINE

## 2024-12-04 PROCEDURE — 80053 COMPREHEN METABOLIC PANEL: CPT | Performed by: INTERNAL MEDICINE

## 2024-12-04 PROCEDURE — 36415 COLL VENOUS BLD VENIPUNCTURE: CPT | Mod: PO | Performed by: INTERNAL MEDICINE

## 2024-12-11 ENCOUNTER — OFFICE VISIT (OUTPATIENT)
Dept: INTERNAL MEDICINE | Facility: CLINIC | Age: 79
End: 2024-12-11
Payer: MEDICARE

## 2024-12-11 VITALS
OXYGEN SATURATION: 95 % | HEART RATE: 92 BPM | SYSTOLIC BLOOD PRESSURE: 124 MMHG | TEMPERATURE: 97 F | BODY MASS INDEX: 29.61 KG/M2 | DIASTOLIC BLOOD PRESSURE: 78 MMHG | WEIGHT: 206.38 LBS

## 2024-12-11 DIAGNOSIS — E11.9 DIABETES MELLITUS WITHOUT COMPLICATION: ICD-10-CM

## 2024-12-11 DIAGNOSIS — Z86.0100 HISTORY OF COLONIC POLYPS: ICD-10-CM

## 2024-12-11 DIAGNOSIS — Z00.00 ROUTINE GENERAL MEDICAL EXAMINATION AT A HEALTH CARE FACILITY: Primary | ICD-10-CM

## 2024-12-11 DIAGNOSIS — E78.5 HYPERLIPIDEMIA ASSOCIATED WITH TYPE 2 DIABETES MELLITUS: ICD-10-CM

## 2024-12-11 DIAGNOSIS — J43.2 CENTRILOBULAR EMPHYSEMA: ICD-10-CM

## 2024-12-11 DIAGNOSIS — E11.69 HYPERLIPIDEMIA ASSOCIATED WITH TYPE 2 DIABETES MELLITUS: ICD-10-CM

## 2024-12-11 DIAGNOSIS — D69.6 THROMBOCYTOPENIA, UNSPECIFIED: ICD-10-CM

## 2024-12-11 PROCEDURE — 99214 OFFICE O/P EST MOD 30 MIN: CPT | Mod: PBBFAC,PO | Performed by: INTERNAL MEDICINE

## 2024-12-11 PROCEDURE — 99999 PR PBB SHADOW E&M-EST. PATIENT-LVL IV: CPT | Mod: PBBFAC,,, | Performed by: INTERNAL MEDICINE

## 2024-12-11 PROCEDURE — 99397 PER PM REEVAL EST PAT 65+ YR: CPT | Mod: S$PBB,GZ,, | Performed by: INTERNAL MEDICINE

## 2024-12-11 NOTE — PROGRESS NOTES
HPI:  Patient is a 79-year-old gentleman who comes in today follow-up of hypertension, lipids, diabetes, emphysema, and for his annual physical exam.  Has been doing well.  His blood sugars have been excellent.  He has had no hypoglycemic problems.  His blood pressures been well controlled.  He has had no problems with his emphysema.      Current MEDS: medcard review, verified and update  Allergies: Per the electronic medical record    Past Medical History:   Diagnosis Date    Centrilobular emphysema     Diabetes mellitus without complication 12/14/2012    History of colonic polyps     Hyperlipidemia associated with type 2 diabetes mellitus     Thrombocytopenia, unspecified     Mild       History reviewed. No pertinent surgical history.    SHx: per the electronic medical record    FHx: recorded in the electronic medical record    ROS:    denies any chest pains or shortness of breath. Denies any nausea, vomiting or diarrhea. Denies any fever, chills or sweats. Denies any change in weight, voice, stool, skin or hair. Denies any dysuria, dyspepsia or dysphagia. Denies any change in vision, hearing or headaches. Denies any swollen lymph nodes or loss of memory.    PE:  /78 (BP Location: Left arm, Patient Position: Sitting)   Pulse 92   Temp 96.5 °F (35.8 °C) (Tympanic)   Wt 93.6 kg (206 lb 5.6 oz)   SpO2 95%   BMI 29.61 kg/m²   Gen: Well-developed, well-nourished, male, in no acute distress, oriented x3  HEENT: neck is supple, no adenopathy, carotids 2+ equal without bruits, thyroid exam normal size without nodules.  CHEST: clear to auscultation and percussion  CVS: regular rate and rhythm without significant murmur, gallop, or rubs  ABD: soft, benign, no rebound no guarding, no distention.  Bowel sounds are normal.     nontender.  No palpable masses.  No organomegaly and no audible bruits.  RECTAL:  Deferred.  EXT: no clubbing, cyanosis, or edema  LYMPH: no cervical, inguinal, or axillary adenopathy  FEET:  no loss of sensation.  No ulcers or pressure sores.  Monofilament testing normal  NEURO: gait normal.  Cranial nerves II- XII intact. No nystagmus.  Speech normal.   Gross motor and sensory unremarkable.    Lab Results   Component Value Date    WBC 6.23 06/06/2024    HGB 16.1 06/06/2024    HCT 49.0 06/06/2024     (L) 06/06/2024    CHOL 153 12/04/2024    TRIG 70 12/04/2024    HDL 42 12/04/2024    ALT 29 12/04/2024    AST 19 12/04/2024     12/04/2024    K 4.5 12/04/2024     12/04/2024    CREATININE 1.0 12/04/2024    BUN 19 12/04/2024    CO2 24 12/04/2024    TSH 1.305 12/04/2024    PSA 0.59 09/20/2021    HGBA1C 7.1 (H) 12/04/2024       Impression:  Diabetes, lipids both very well controlled on current meds  COPD, asymptomatic  Patient Active Problem List   Diagnosis    Centrilobular emphysema    Diabetes mellitus without complication    History of colonic polyps    Hyperlipidemia associated with type 2 diabetes mellitus    Thrombocytopenia, unspecified       Plan:   Orders Placed This Encounter    Hemoglobin A1C    Comprehensive Metabolic Panel    Lipid Panel    TSH    CBC Auto Differential    Microalbumin/Creatinine Ratio, Urine     Patient will continue with his current medications.  He will be seen again in 6 months with the above lab work.  This note is generated with speech recognition software and is subject to transcription error and sound alike phrases that may be missed by proofreading.

## 2025-02-21 DIAGNOSIS — Z00.00 ENCOUNTER FOR MEDICARE ANNUAL WELLNESS EXAM: ICD-10-CM

## 2025-06-11 ENCOUNTER — LAB VISIT (OUTPATIENT)
Dept: LAB | Facility: HOSPITAL | Age: 80
End: 2025-06-11
Attending: INTERNAL MEDICINE
Payer: MEDICARE

## 2025-06-11 DIAGNOSIS — E11.9 DIABETES MELLITUS WITHOUT COMPLICATION: ICD-10-CM

## 2025-06-11 LAB
ABSOLUTE EOSINOPHIL (OHS): 0.12 K/UL
ABSOLUTE MONOCYTE (OHS): 0.47 K/UL (ref 0.3–1)
ABSOLUTE NEUTROPHIL COUNT (OHS): 4.7 K/UL (ref 1.8–7.7)
ALBUMIN SERPL BCP-MCNC: 3.8 G/DL (ref 3.5–5.2)
ALBUMIN/CREAT UR: 61.6 UG/MG
ALP SERPL-CCNC: 77 UNIT/L (ref 40–150)
ALT SERPL W/O P-5'-P-CCNC: 20 UNIT/L (ref 10–44)
ANION GAP (OHS): 9 MMOL/L (ref 8–16)
AST SERPL-CCNC: 14 UNIT/L (ref 11–45)
BASOPHILS # BLD AUTO: 0.02 K/UL
BASOPHILS NFR BLD AUTO: 0.3 %
BILIRUB SERPL-MCNC: 1 MG/DL (ref 0.1–1)
BUN SERPL-MCNC: 16 MG/DL (ref 8–23)
CALCIUM SERPL-MCNC: 8.7 MG/DL (ref 8.7–10.5)
CHLORIDE SERPL-SCNC: 104 MMOL/L (ref 95–110)
CHOLEST SERPL-MCNC: 131 MG/DL (ref 120–199)
CHOLEST/HDLC SERPL: 3.5 {RATIO} (ref 2–5)
CO2 SERPL-SCNC: 25 MMOL/L (ref 23–29)
CREAT SERPL-MCNC: 0.8 MG/DL (ref 0.5–1.4)
CREAT UR-MCNC: 125 MG/DL (ref 23–375)
EAG (OHS): 163 MG/DL (ref 68–131)
ERYTHROCYTE [DISTWIDTH] IN BLOOD BY AUTOMATED COUNT: 13.3 % (ref 11.5–14.5)
GFR SERPLBLD CREATININE-BSD FMLA CKD-EPI: >60 ML/MIN/1.73/M2
GLUCOSE SERPL-MCNC: 136 MG/DL (ref 70–110)
HBA1C MFR BLD: 7.3 % (ref 4–5.6)
HCT VFR BLD AUTO: 49.4 % (ref 40–54)
HDLC SERPL-MCNC: 37 MG/DL (ref 40–75)
HDLC SERPL: 28.2 % (ref 20–50)
HGB BLD-MCNC: 16 GM/DL (ref 14–18)
IMM GRANULOCYTES # BLD AUTO: 0.03 K/UL (ref 0–0.04)
IMM GRANULOCYTES NFR BLD AUTO: 0.5 % (ref 0–0.5)
LDLC SERPL CALC-MCNC: 77.4 MG/DL (ref 63–159)
LYMPHOCYTES # BLD AUTO: 1.15 K/UL (ref 1–4.8)
MCH RBC QN AUTO: 28.5 PG (ref 27–31)
MCHC RBC AUTO-ENTMCNC: 32.4 G/DL (ref 32–36)
MCV RBC AUTO: 88 FL (ref 82–98)
MICROALBUMIN UR-MCNC: 77 UG/ML (ref ?–5000)
NONHDLC SERPL-MCNC: 94 MG/DL
NUCLEATED RBC (/100WBC) (OHS): 0 /100 WBC
PLATELET # BLD AUTO: 143 K/UL (ref 150–450)
PMV BLD AUTO: 11.5 FL (ref 9.2–12.9)
POTASSIUM SERPL-SCNC: 4 MMOL/L (ref 3.5–5.1)
PROT SERPL-MCNC: 6.6 GM/DL (ref 6–8.4)
RBC # BLD AUTO: 5.61 M/UL (ref 4.6–6.2)
RELATIVE EOSINOPHIL (OHS): 1.8 %
RELATIVE LYMPHOCYTE (OHS): 17.7 % (ref 18–48)
RELATIVE MONOCYTE (OHS): 7.2 % (ref 4–15)
RELATIVE NEUTROPHIL (OHS): 72.5 % (ref 38–73)
SODIUM SERPL-SCNC: 138 MMOL/L (ref 136–145)
TRIGL SERPL-MCNC: 83 MG/DL (ref 30–150)
TSH SERPL-ACNC: 1.41 UIU/ML (ref 0.4–4)
WBC # BLD AUTO: 6.49 K/UL (ref 3.9–12.7)

## 2025-06-11 PROCEDURE — 80053 COMPREHEN METABOLIC PANEL: CPT

## 2025-06-11 PROCEDURE — 83036 HEMOGLOBIN GLYCOSYLATED A1C: CPT

## 2025-06-11 PROCEDURE — 85025 COMPLETE CBC W/AUTO DIFF WBC: CPT

## 2025-06-11 PROCEDURE — 82570 ASSAY OF URINE CREATININE: CPT

## 2025-06-11 PROCEDURE — 80061 LIPID PANEL: CPT

## 2025-06-11 PROCEDURE — 36415 COLL VENOUS BLD VENIPUNCTURE: CPT | Mod: PO

## 2025-06-11 PROCEDURE — 84443 ASSAY THYROID STIM HORMONE: CPT

## 2025-06-12 RX ORDER — ATORVASTATIN CALCIUM 40 MG/1
40 TABLET, FILM COATED ORAL NIGHTLY
Qty: 90 TABLET | Refills: 3 | Status: SHIPPED | OUTPATIENT
Start: 2025-06-12

## 2025-06-18 ENCOUNTER — OFFICE VISIT (OUTPATIENT)
Dept: INTERNAL MEDICINE | Facility: CLINIC | Age: 80
End: 2025-06-18
Payer: MEDICARE

## 2025-06-18 VITALS
TEMPERATURE: 98 F | HEART RATE: 64 BPM | DIASTOLIC BLOOD PRESSURE: 74 MMHG | BODY MASS INDEX: 29.48 KG/M2 | OXYGEN SATURATION: 97 % | WEIGHT: 205.94 LBS | HEIGHT: 70 IN | SYSTOLIC BLOOD PRESSURE: 144 MMHG

## 2025-06-18 DIAGNOSIS — T38.3X5S ADVERSE EFFECT OF METFORMIN, SEQUELA: ICD-10-CM

## 2025-06-18 DIAGNOSIS — E11.69 HYPERLIPIDEMIA ASSOCIATED WITH TYPE 2 DIABETES MELLITUS: ICD-10-CM

## 2025-06-18 DIAGNOSIS — J43.2 CENTRILOBULAR EMPHYSEMA: ICD-10-CM

## 2025-06-18 DIAGNOSIS — E11.9 DIABETES MELLITUS WITHOUT COMPLICATION: Primary | ICD-10-CM

## 2025-06-18 DIAGNOSIS — Z79.899 OTHER LONG TERM (CURRENT) DRUG THERAPY: ICD-10-CM

## 2025-06-18 DIAGNOSIS — E78.5 HYPERLIPIDEMIA ASSOCIATED WITH TYPE 2 DIABETES MELLITUS: ICD-10-CM

## 2025-06-18 DIAGNOSIS — N13.8 BPH WITH OBSTRUCTION/LOWER URINARY TRACT SYMPTOMS: ICD-10-CM

## 2025-06-18 DIAGNOSIS — F41.1 GAD (GENERALIZED ANXIETY DISORDER): ICD-10-CM

## 2025-06-18 DIAGNOSIS — N40.1 BPH WITH OBSTRUCTION/LOWER URINARY TRACT SYMPTOMS: ICD-10-CM

## 2025-06-18 PROBLEM — D69.6 THROMBOCYTOPENIA, UNSPECIFIED: Status: RESOLVED | Noted: 2023-05-31 | Resolved: 2025-06-18

## 2025-06-18 PROCEDURE — 99214 OFFICE O/P EST MOD 30 MIN: CPT | Mod: S$PBB,,,

## 2025-06-18 PROCEDURE — 99999 PR PBB SHADOW E&M-EST. PATIENT-LVL III: CPT | Mod: PBBFAC,,,

## 2025-06-18 PROCEDURE — G2211 COMPLEX E/M VISIT ADD ON: HCPCS | Mod: ,,,

## 2025-06-18 PROCEDURE — 99213 OFFICE O/P EST LOW 20 MIN: CPT | Mod: PBBFAC,PO

## 2025-06-18 RX ORDER — ALPRAZOLAM 0.25 MG/1
0.25 TABLET ORAL 2 TIMES DAILY PRN
Qty: 60 TABLET | Refills: 5 | Status: SHIPPED | OUTPATIENT
Start: 2025-06-18

## 2025-06-18 RX ORDER — ATORVASTATIN CALCIUM 40 MG/1
40 TABLET, FILM COATED ORAL NIGHTLY
Qty: 90 TABLET | Refills: 3 | Status: SHIPPED | OUTPATIENT
Start: 2025-06-18

## 2025-06-18 NOTE — PROGRESS NOTES
Patient ID: Bob Chan is a 79 y.o. male.    Chief Complaint: Follow-up (6 month/ Est Care from Dr. Pandey)    History of Present Illness    CHIEF COMPLAINT:  - Mr. Chan presents for a routine follow-up visit and medication refills.    HPI:  Mr. Chan reports feeling well and adhering to his medication regimen as prescribed. 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 Regarding one of his medications (likely Xanax), he takes it once every couple of days or as needed, instead of the prescribed twice daily dosage. He expresses concern about potential memory issues associated with long-term use of this medication, noting recent forgetfulness.    He denies any new or worsening symptoms.      ROS:  General: -fever, -chills, -fatigue, -weight gain, -weight loss  Eyes: -vision changes, -redness, -discharge, +loss of vision  ENT: -ear pain, -nasal congestion, -sore throat  Cardiovascular: -chest pain, -palpitations, -lower extremity edema  Respiratory: -cough, -shortness of breath  Gastrointestinal: -abdominal pain, -nausea, -vomiting, -diarrhea, -constipation, -blood in stool  Genitourinary: -dysuria, -hematuria, -frequency  Musculoskeletal: -joint pain, -muscle pain  Skin: -rash, -lesion  Neurological: -headache, -dizziness, -numbness, -tingling, +memory problems, +forgetfulness, +memory loss  Psychiatric: +anxiety, -depression, -sleep difficulty         Pmh, Psh, Family Hx, Social Hx updated in Epic Tabs today.         6/18/2025     9:06 AM 6/11/2024    10:43 AM 5/31/2023     8:47 AM 4/27/2022     8:34 AM 9/27/2021     8:53 AM 5/25/2018     3:43 PM 6/8/2017     8:00 AM   Depression Patient Health Questionnaire   Over the last two weeks how often have you been bothered by little interest or pleasure in doing things Not at all Not at all Not at all Not at all  Not at all  Not at all  Not at all    Over the last two weeks how  often have you been bothered by feeling down, depressed or hopeless Not at all Not at all Not at all Not at all Not at all  Not at all  Not at all    PHQ-2 Total Score 0 0 0 0 0 0 0       Data saved with a previous flowsheet row definition       Active Problem List with Overview Notes    Diagnosis Date Noted    Thrombocytopenia, unspecified 05/31/2023     Mild      Hyperlipidemia associated with type 2 diabetes mellitus     History of colonic polyps     Centrilobular emphysema     Diabetes mellitus without complication 12/14/2012       Past Medical History:   Diagnosis Date    Centrilobular emphysema     Diabetes mellitus without complication 12/14/2012    History of colonic polyps     Hyperlipidemia associated with type 2 diabetes mellitus     Thrombocytopenia, unspecified     Mild       No past surgical history on file.    Family History   Problem Relation Name Age of Onset    Cancer Mother         Social History     Socioeconomic History    Marital status:    Tobacco Use    Smoking status: Former    Smokeless tobacco: Never   Substance and Sexual Activity    Alcohol use: No    Drug use: No    Sexual activity: Yes     Partners: Female     Social Drivers of Health     Food Insecurity: No Food Insecurity (7/16/2024)    Received from [facility] Subsidi[facility] and Affiliates    Hunger Vital Sign     Worried About Running Out of Food in the Last Year: Never true     Ran Out of Food in the Last Year: Never true   Transportation Needs: No Transportation Needs (7/16/2024)    [facility]    PRAPARE - Transportation     Lack of Transportation (Medical): No     Lack of Transportation (Non-Medical): No       Medications Ordered Prior to Encounter[1]    Review of patient's allergies indicates:   Allergen Reactions    No known drug allergies        General - Well developed, alert and  oriented in NAD  HEENT - normocephalic, no evidence of trauma, sclera white, EOMI  Neck - full range of motion  COR - regular rate and rhythm without murmurs or gallops  Lungs - Clear  Abdomen - soft, non-tender  Ext - no cyanosis or edema     Assessment:     1. Diabetes mellitus without complication    2. Hyperlipidemia associated with type 2 diabetes mellitus    3. BPH with obstruction/lower urinary tract symptoms    4. ALBINO (generalized anxiety disorder)    5. Other long term (current) drug therapy    6. Adverse effect of metformin, sequela        Pertinent Labs:    Chemistry        Component Value Date/Time     06/11/2025 0801     12/04/2024 0815    K 4.0 06/11/2025 0801    K 4.5 12/04/2024 0815     06/11/2025 0801     12/04/2024 0815    CO2 25 06/11/2025 0801    CO2 24 12/04/2024 0815    BUN 16 06/11/2025 0801    CREATININE 0.8 06/11/2025 0801     (H) 06/11/2025 0801     (H) 12/04/2024 0815        Component Value Date/Time    CALCIUM 8.7 06/11/2025 0801    CALCIUM 9.6 12/04/2024 0815    ALKPHOS 77 06/11/2025 0801    ALKPHOS 80 12/04/2024 0815    AST 14 06/11/2025 0801    AST 19 12/04/2024 0815    ALT 20 06/11/2025 0801    ALT 29 12/04/2024 0815    BILITOT 1.0 06/11/2025 0801    BILITOT 1.0 12/04/2024 0815    ESTGFRAFRICA 54 07/16/2024 0352    ESTGFRAFRICA >60.0 04/20/2022 0806    EGFRNONAA >60.0 04/20/2022 0806          Lab Results   Component Value Date    WBC 6.49 06/11/2025    HGB 16.0 06/11/2025    HCT 49.4 06/11/2025    MCV 88 06/11/2025    MCH 28.5 06/11/2025    MCHC 32.4 06/11/2025    RDW 13.3 06/11/2025     (L) 06/11/2025    MPV 11.5 06/11/2025    PLTEST Decreased (A) 06/29/2015       Lab Results   Component Value Date    HGBA1C 7.3 (H) 06/11/2025    HGBA1C 7.1 (H) 12/04/2024    HGBA1C 6.7 (H) 06/06/2024     (H) 06/11/2025     Lab Results   Component Value Date    LDLCALC 77.4 06/11/2025     Lab Results   Component Value Date    TSH 1.411 06/11/2025  "    Lab Results   Component Value Date    CHOL 131 06/11/2025    CHOL 153 12/04/2024    CHOL 197 06/06/2024     Lab Results   Component Value Date    TRIG 83 06/11/2025    TRIG 70 12/04/2024    TRIG 97 06/06/2024     Lab Results   Component Value Date    HDL 37 (L) 06/11/2025    HDL 42 12/04/2024    HDL 36 (L) 06/06/2024     Lab Results   Component Value Date    LDLCALC 77.4 06/11/2025    LDLCALC 97.0 12/04/2024    LDLCALC 141.6 06/06/2024     No results found for: "NONHDLC"  Lab Results   Component Value Date    CHOLHDL 28.2 06/11/2025    CHOLHDL 27.5 12/04/2024    CHOLHDL 18.3 (L) 06/06/2024       The 10-year ASCVD risk score (Hollie MCCORMACK, et al., 2019) is: 58.2%    Values used to calculate the score:      Age: 79 years      Sex: Male      Is Non- : No      Diabetic: Yes      Tobacco smoker: No      Systolic Blood Pressure: 144 mmHg      Is BP treated: No      HDL Cholesterol: 37 mg/dL      Total Cholesterol: 131 mg/dL    Plan:     Assessment & Plan    Reviewed current medications, including Xanax (alprazolam) for anxiety.  Considered potential long-term risks of Xanax use, including memory issues and dementia.  Assessed overall health status and medication management.    TYPE 2 DIABETES MELLITUS WITH OTHER SPECIFIED COMPLICATION:  - Mr. Chan reports feeling good with stable diabetes and hyperlipidemia management.  - Current medication regimen is effective and should be continued.  - Will refill Xanax for anxiety management, which may indirectly benefit glycemic and lipid control.    CENTRILOBULAR EMPHYSEMA:  - Mr. Chan reports stable respiratory status and is not currently using inhalers.  - Will continue to monitor respiratory condition.  - Xanax refill for anxiety management may also indirectly benefit respiratory status by reducing stress-related symptoms.    ANXIETY DISORDER:  - Discussed potential long-term effects of alprazolam on memory and dementia.  - Advised to take only as needed " rather than daily.    HEARING LOSS:  - Noted the patient's hearing issues due to shooting guns for 25 years.    BENIGN PROSTATIC HYPERPLASIA:  - Continued the 2 medications for prostate issues.    GENERAL MEDICATION MANAGEMENT:  - Discussed importance of taking medications as prescribed and benefits of minimizing unnecessary medication use.  - Continued other current medications without changes.         1. Diabetes mellitus without complication  - atorvastatin (LIPITOR) 40 MG tablet; Take 1 tablet (40 mg total) by mouth every evening.  Dispense: 90 tablet; Refill: 3  - empagliflozin (JARDIANCE) 10 mg tablet; Take 1 tablet (10 mg total) by mouth once daily.  Dispense: 90 tablet; Refill: 3    2. Hyperlipidemia associated with type 2 diabetes mellitus    3. BPH with obstruction/lower urinary tract symptoms    4. ALBINO (generalized anxiety disorder)  - ALPRAZolam (XANAX) 0.25 MG tablet; Take 1 tablet (0.25 mg total) by mouth 2 (two) times daily as needed for Anxiety.  Dispense: 60 tablet; Refill: 5    5. Other long term (current) drug therapy  - CBC Auto Differential; Future  - Comprehensive Metabolic Panel; Future  - Hemoglobin A1C; Future  - Lipid Panel; Future    6. Adverse effect of metformin, sequela    DM2: Jardiance 10 mg daily.    Hyperlipidemia: Atorvastatin 40 mg daily.    BPH: Finasteride 5 mg daily, tamsulosin 0.4 mg daily.  Anxiety:  Alprazolam 0.25 mg as needed.  COPD:  Symptoms well controlled with steel toe Respimat, fluticasone-salmeterol Diskus inhaler, DuoNeb as needed.    Immunization History   Administered Date(s) Administered    COVID-19 MRNA, LN-S PF (MODERNA HALF 0.25 ML DOSE) 11/18/2021    COVID-19, MRNA, LN-S, PF (MODERNA FULL 0.5 ML DOSE) 01/22/2021, 02/19/2021    Influenza (FLUAD) - Quadrivalent - Adjuvanted - PF *Preferred* (65+) 09/27/2021, 12/05/2022, 12/06/2023    Influenza - Quadrivalent 12/08/2016    Influenza - Quadrivalent - PF *Preferred* (6 months and older) 11/14/2009    Influenza -  Trivalent - Afluria, Fluzone MDV 11/13/2012    Influenza - Trivalent - Fluarix, Flulaval, Fluzone, Afluria - PF 11/14/2013    Influenza - Trivalent - Fluzone High Dose - PF (65 years and older) 12/11/2017, 12/12/2018, 12/09/2019    Influenza Whole 10/07/2014, 10/07/2015    Pneumococcal Conjugate - 13 Valent 01/07/2015, 06/29/2015    Pneumococcal Conjugate - 20 Valent 05/31/2023    Pneumococcal Polysaccharide - 23 Valent 01/03/2012    Td (Adult), Unspecified Formulation 04/04/2015    Zoster 09/27/2012       Orders Placed This Encounter   Procedures    CBC Auto Differential    Comprehensive Metabolic Panel    Hemoglobin A1C    Lipid Panel       Portions of this note were generated by [employer].    Each patient to whom medical services by telemedicine are provided:  (1) informed of the relationship between the physician and patient and the respective role of any other health care provider with respect to management of the patient; and (2) notified that he or she may decline to receive medical services by telemedicine and may withdraw from such care at any time.    I spent a total of 35 minutes face to face and non-face to face on the date of this visit.This includes time preparing to see the patient (eg, review of tests, notes), obtaining and/or reviewing additional history from an independent historian and/or outside medical records, documenting clinical information in the electronic health record, independently interpreting results and/or communicating results to the patient/family/caregiver, or care coordinator.  Visit today included increased complexity associated with the care of the episodic problem addressed and managing the longitudinal care of the patient due to the serious and/or complex managed problem(s).      This note was generated with the assistance of ambient listening technology. Verbal consent was obtained by the patient and accompanying visitor(s) for the recording of patient appointment to  facilitate this note. I attest to having reviewed and edited the generated note for accuracy, though some syntax or spelling errors may persist. Please contact the author of this note for any clarification.      Elmer Friedman MD         [1]   Current Outpatient Medications on File Prior to Visit   Medication Sig Dispense Refill    albuterol-ipratropium  mcg (COMBIVENT)  mcg/actuation inhaler Inhale 2 puffs into the lungs Every 6 hours as needed.      cetirizine 10 mg Cap Take 10 mg by mouth once daily.       finasteride (PROSCAR) 5 mg tablet Take 1 tablet (5 mg total) by mouth once daily. 90 tablet 3    fluticasone-salmeterol 250-50 mcg/dose (ADVAIR) 250-50 mcg/dose diskus inhaler Inhale into the lungs.      ipratropium-albuterol (COMBIVENT)  mcg/actuation inhaler Inhale 1 puff into the lungs every 4 (four) hours as needed.       pantoprazole (PROTONIX) 40 MG tablet Take 40 mg by mouth once daily.       sildenafiL (VIAGRA) 100 MG tablet       tamsulosin (FLOMAX) 0.4 mg Cap Take 1 capsule (0.4 mg total) by mouth once daily. 90 capsule 3    tiotropium-olodateroL (STIOLTO RESPIMAT) 2.5-2.5 mcg/actuation Mist INHALE 2 PUFFS INTO THE LUNGS DAILY.      [DISCONTINUED] ALPRAZolam (XANAX) 0.25 MG tablet TAKE ONE TABLET BY MOUTH THREE TIMES DAILY AS NEEDED 60 tablet 5    [DISCONTINUED] atorvastatin (LIPITOR) 40 MG tablet Take 1 tablet (40 mg total) by mouth every evening. 90 tablet 3    [DISCONTINUED] empagliflozin (JARDIANCE) 10 mg tablet Take 1 tablet (10 mg total) by mouth once daily. 90 tablet 3    [DISCONTINUED] meloxicam (MOBIC) 15 MG tablet Take 1 tablet (15 mg total) by mouth once daily. 30 tablet 2    [DISCONTINUED] methscopolamine (PAMINE FORTE) 5 MG tablet Take 1 tablet by mouth Daily.       No current facility-administered medications on file prior to visit.

## 2025-08-04 RX ORDER — TAMSULOSIN HYDROCHLORIDE 0.4 MG/1
0.4 CAPSULE ORAL DAILY
Qty: 90 CAPSULE | Refills: 3 | Status: SHIPPED | OUTPATIENT
Start: 2025-08-04 | End: 2026-08-04

## 2025-08-04 NOTE — TELEPHONE ENCOUNTER
Requested Prescriptions     Pending Prescriptions Disp Refills    tamsulosin (FLOMAX) 0.4 mg Cap 90 capsule 3     Sig: Take 1 capsule (0.4 mg total) by mouth once daily.     LV 6/18/25  LF 6/26/25 Dannie

## 2025-08-12 RX ORDER — FINASTERIDE 5 MG/1
5 TABLET, FILM COATED ORAL DAILY
Qty: 90 TABLET | Refills: 3 | Status: SHIPPED | OUTPATIENT
Start: 2025-08-12 | End: 2026-08-12